# Patient Record
Sex: FEMALE | Race: WHITE | NOT HISPANIC OR LATINO | Employment: FULL TIME | URBAN - METROPOLITAN AREA
[De-identification: names, ages, dates, MRNs, and addresses within clinical notes are randomized per-mention and may not be internally consistent; named-entity substitution may affect disease eponyms.]

---

## 2022-01-02 ENCOUNTER — HOSPITAL ENCOUNTER (INPATIENT)
Facility: MEDICAL CENTER | Age: 57
LOS: 1 days | DRG: 854 | End: 2022-01-03
Attending: EMERGENCY MEDICINE | Admitting: INTERNAL MEDICINE
Payer: COMMERCIAL

## 2022-01-02 ENCOUNTER — ANESTHESIA (OUTPATIENT)
Dept: SURGERY | Facility: MEDICAL CENTER | Age: 57
DRG: 854 | End: 2022-01-02
Payer: COMMERCIAL

## 2022-01-02 ENCOUNTER — ANESTHESIA EVENT (OUTPATIENT)
Dept: SURGERY | Facility: MEDICAL CENTER | Age: 57
DRG: 854 | End: 2022-01-02
Payer: COMMERCIAL

## 2022-01-02 ENCOUNTER — APPOINTMENT (OUTPATIENT)
Dept: RADIOLOGY | Facility: MEDICAL CENTER | Age: 57
DRG: 854 | End: 2022-01-02
Attending: UROLOGY
Payer: COMMERCIAL

## 2022-01-02 ENCOUNTER — APPOINTMENT (OUTPATIENT)
Dept: RADIOLOGY | Facility: MEDICAL CENTER | Age: 57
DRG: 854 | End: 2022-01-02
Attending: EMERGENCY MEDICINE
Payer: COMMERCIAL

## 2022-01-02 ENCOUNTER — HOSPITAL ENCOUNTER (OUTPATIENT)
Dept: RADIOLOGY | Facility: MEDICAL CENTER | Age: 57
End: 2022-01-02

## 2022-01-02 DIAGNOSIS — N20.0 KIDNEY STONE ON LEFT SIDE: ICD-10-CM

## 2022-01-02 PROBLEM — D72.829 LEUCOCYTOSIS: Status: ACTIVE | Noted: 2022-01-02

## 2022-01-02 PROBLEM — A41.9 SEPSIS (HCC): Status: ACTIVE | Noted: 2022-01-02

## 2022-01-02 PROBLEM — N12 PYELONEPHRITIS: Status: ACTIVE | Noted: 2022-01-02

## 2022-01-02 LAB
ALBUMIN SERPL BCP-MCNC: 4.5 G/DL (ref 3.2–4.9)
ALBUMIN/GLOB SERPL: 1.9 G/DL
ALP SERPL-CCNC: 49 U/L (ref 30–99)
ALT SERPL-CCNC: 42 U/L (ref 2–50)
ANION GAP SERPL CALC-SCNC: 12 MMOL/L (ref 7–16)
APPEARANCE UR: CLEAR
AST SERPL-CCNC: 29 U/L (ref 12–45)
BACTERIA #/AREA URNS HPF: ABNORMAL /HPF
BASOPHILS # BLD AUTO: 0.5 % (ref 0–1.8)
BASOPHILS # BLD: 0.07 K/UL (ref 0–0.12)
BILIRUB SERPL-MCNC: 0.5 MG/DL (ref 0.1–1.5)
BILIRUB UR QL STRIP.AUTO: NEGATIVE
BUN SERPL-MCNC: 14 MG/DL (ref 8–22)
CALCIUM SERPL-MCNC: 9.4 MG/DL (ref 8.5–10.5)
CHLORIDE SERPL-SCNC: 105 MMOL/L (ref 96–112)
CO2 SERPL-SCNC: 23 MMOL/L (ref 20–33)
COLOR UR: YELLOW
CREAT SERPL-MCNC: 0.56 MG/DL (ref 0.5–1.4)
EOSINOPHIL # BLD AUTO: 0.07 K/UL (ref 0–0.51)
EOSINOPHIL NFR BLD: 0.5 % (ref 0–6.9)
EPI CELLS #/AREA URNS HPF: NEGATIVE /HPF
ERYTHROCYTE [DISTWIDTH] IN BLOOD BY AUTOMATED COUNT: 42.1 FL (ref 35.9–50)
FLUAV RNA SPEC QL NAA+PROBE: NEGATIVE
FLUBV RNA SPEC QL NAA+PROBE: NEGATIVE
GLOBULIN SER CALC-MCNC: 2.4 G/DL (ref 1.9–3.5)
GLUCOSE SERPL-MCNC: 104 MG/DL (ref 65–99)
GLUCOSE UR STRIP.AUTO-MCNC: NEGATIVE MG/DL
HCT VFR BLD AUTO: 44 % (ref 37–47)
HGB BLD-MCNC: 14.6 G/DL (ref 12–16)
HYALINE CASTS #/AREA URNS LPF: ABNORMAL /LPF
IMM GRANULOCYTES # BLD AUTO: 0.07 K/UL (ref 0–0.11)
IMM GRANULOCYTES NFR BLD AUTO: 0.5 % (ref 0–0.9)
INR PPP: 1 (ref 0.87–1.13)
KETONES UR STRIP.AUTO-MCNC: NEGATIVE MG/DL
LACTATE BLD-SCNC: 1.3 MMOL/L (ref 0.5–2)
LEUKOCYTE ESTERASE UR QL STRIP.AUTO: ABNORMAL
LIPASE SERPL-CCNC: 24 U/L (ref 11–82)
LYMPHOCYTES # BLD AUTO: 1.62 K/UL (ref 1–4.8)
LYMPHOCYTES NFR BLD: 10.7 % (ref 22–41)
MCH RBC QN AUTO: 30.3 PG (ref 27–33)
MCHC RBC AUTO-ENTMCNC: 33.2 G/DL (ref 33.6–35)
MCV RBC AUTO: 91.3 FL (ref 81.4–97.8)
MICRO URNS: ABNORMAL
MONOCYTES # BLD AUTO: 0.94 K/UL (ref 0–0.85)
MONOCYTES NFR BLD AUTO: 6.2 % (ref 0–13.4)
NEUTROPHILS # BLD AUTO: 12.31 K/UL (ref 2–7.15)
NEUTROPHILS NFR BLD: 81.6 % (ref 44–72)
NITRITE UR QL STRIP.AUTO: POSITIVE
NRBC # BLD AUTO: 0 K/UL
NRBC BLD-RTO: 0 /100 WBC
PH UR STRIP.AUTO: 7.5 [PH] (ref 5–8)
PLATELET # BLD AUTO: 318 K/UL (ref 164–446)
PMV BLD AUTO: 9.5 FL (ref 9–12.9)
POTASSIUM SERPL-SCNC: 4.1 MMOL/L (ref 3.6–5.5)
PROT SERPL-MCNC: 6.9 G/DL (ref 6–8.2)
PROT UR QL STRIP: 30 MG/DL
PROTHROMBIN TIME: 12.9 SEC (ref 12–14.6)
RBC # BLD AUTO: 4.82 M/UL (ref 4.2–5.4)
RBC # URNS HPF: ABNORMAL /HPF
RBC UR QL AUTO: ABNORMAL
RSV RNA SPEC QL NAA+PROBE: NEGATIVE
SARS-COV-2 RNA RESP QL NAA+PROBE: NOTDETECTED
SODIUM SERPL-SCNC: 140 MMOL/L (ref 135–145)
SP GR UR STRIP.AUTO: 1.01
SPECIMEN SOURCE: NORMAL
UROBILINOGEN UR STRIP.AUTO-MCNC: 1 MG/DL
WBC # BLD AUTO: 15.1 K/UL (ref 4.8–10.8)
WBC #/AREA URNS HPF: ABNORMAL /HPF

## 2022-01-02 PROCEDURE — 770001 HCHG ROOM/CARE - MED/SURG/GYN PRIV*

## 2022-01-02 PROCEDURE — 87040 BLOOD CULTURE FOR BACTERIA: CPT

## 2022-01-02 PROCEDURE — C2617 STENT, NON-COR, TEM W/O DEL: HCPCS | Performed by: UROLOGY

## 2022-01-02 PROCEDURE — 0T778DZ DILATION OF LEFT URETER WITH INTRALUMINAL DEVICE, VIA NATURAL OR ARTIFICIAL OPENING ENDOSCOPIC: ICD-10-PCS | Performed by: UROLOGY

## 2022-01-02 PROCEDURE — 81001 URINALYSIS AUTO W/SCOPE: CPT

## 2022-01-02 PROCEDURE — 160048 HCHG OR STATISTICAL LEVEL 1-5: Performed by: UROLOGY

## 2022-01-02 PROCEDURE — 160009 HCHG ANES TIME/MIN: Performed by: UROLOGY

## 2022-01-02 PROCEDURE — A9270 NON-COVERED ITEM OR SERVICE: HCPCS | Performed by: INTERNAL MEDICINE

## 2022-01-02 PROCEDURE — 99223 1ST HOSP IP/OBS HIGH 75: CPT | Performed by: INTERNAL MEDICINE

## 2022-01-02 PROCEDURE — 0241U HCHG SARS-COV-2 COVID-19 NFCT DS RESP RNA 4 TRGT MIC: CPT

## 2022-01-02 PROCEDURE — 700111 HCHG RX REV CODE 636 W/ 250 OVERRIDE (IP): Performed by: INTERNAL MEDICINE

## 2022-01-02 PROCEDURE — 87086 URINE CULTURE/COLONY COUNT: CPT

## 2022-01-02 PROCEDURE — 71045 X-RAY EXAM CHEST 1 VIEW: CPT

## 2022-01-02 PROCEDURE — 85610 PROTHROMBIN TIME: CPT

## 2022-01-02 PROCEDURE — 96375 TX/PRO/DX INJ NEW DRUG ADDON: CPT

## 2022-01-02 PROCEDURE — 96374 THER/PROPH/DIAG INJ IV PUSH: CPT

## 2022-01-02 PROCEDURE — 700102 HCHG RX REV CODE 250 W/ 637 OVERRIDE(OP): Performed by: INTERNAL MEDICINE

## 2022-01-02 PROCEDURE — 700101 HCHG RX REV CODE 250: Performed by: ANESTHESIOLOGY

## 2022-01-02 PROCEDURE — C1769 GUIDE WIRE: HCPCS | Performed by: UROLOGY

## 2022-01-02 PROCEDURE — 84145 PROCALCITONIN (PCT): CPT

## 2022-01-02 PROCEDURE — 700111 HCHG RX REV CODE 636 W/ 250 OVERRIDE (IP): Performed by: EMERGENCY MEDICINE

## 2022-01-02 PROCEDURE — 85025 COMPLETE CBC W/AUTO DIFF WBC: CPT

## 2022-01-02 PROCEDURE — 99291 CRITICAL CARE FIRST HOUR: CPT

## 2022-01-02 PROCEDURE — 87186 SC STD MICRODIL/AGAR DIL: CPT

## 2022-01-02 PROCEDURE — C9803 HOPD COVID-19 SPEC COLLECT: HCPCS | Performed by: EMERGENCY MEDICINE

## 2022-01-02 PROCEDURE — 36415 COLL VENOUS BLD VENIPUNCTURE: CPT

## 2022-01-02 PROCEDURE — 83690 ASSAY OF LIPASE: CPT

## 2022-01-02 PROCEDURE — 83605 ASSAY OF LACTIC ACID: CPT

## 2022-01-02 PROCEDURE — 700105 HCHG RX REV CODE 258: Performed by: ANESTHESIOLOGY

## 2022-01-02 PROCEDURE — 700105 HCHG RX REV CODE 258: Performed by: INTERNAL MEDICINE

## 2022-01-02 PROCEDURE — 160035 HCHG PACU - 1ST 60 MINS PHASE I: Performed by: UROLOGY

## 2022-01-02 PROCEDURE — 160028 HCHG SURGERY MINUTES - 1ST 30 MINS LEVEL 3: Performed by: UROLOGY

## 2022-01-02 PROCEDURE — 87077 CULTURE AEROBIC IDENTIFY: CPT

## 2022-01-02 PROCEDURE — 80053 COMPREHEN METABOLIC PANEL: CPT

## 2022-01-02 PROCEDURE — 160002 HCHG RECOVERY MINUTES (STAT): Performed by: UROLOGY

## 2022-01-02 PROCEDURE — 700111 HCHG RX REV CODE 636 W/ 250 OVERRIDE (IP): Performed by: ANESTHESIOLOGY

## 2022-01-02 DEVICE — STENT UROLOGICAL POLARIS 6X26  ULTRA: Type: IMPLANTABLE DEVICE | Site: URETER | Status: FUNCTIONAL

## 2022-01-02 RX ORDER — HYDRALAZINE HYDROCHLORIDE 20 MG/ML
5 INJECTION INTRAMUSCULAR; INTRAVENOUS
Status: DISCONTINUED | OUTPATIENT
Start: 2022-01-02 | End: 2022-01-02 | Stop reason: HOSPADM

## 2022-01-02 RX ORDER — TRAZODONE HYDROCHLORIDE 50 MG/1
100 TABLET ORAL NIGHTLY
Status: DISCONTINUED | OUTPATIENT
Start: 2022-01-02 | End: 2022-01-03 | Stop reason: HOSPADM

## 2022-01-02 RX ORDER — HYDROMORPHONE HYDROCHLORIDE 1 MG/ML
0.5 INJECTION, SOLUTION INTRAMUSCULAR; INTRAVENOUS; SUBCUTANEOUS ONCE
Status: COMPLETED | OUTPATIENT
Start: 2022-01-02 | End: 2022-01-02

## 2022-01-02 RX ORDER — ONDANSETRON 2 MG/ML
4 INJECTION INTRAMUSCULAR; INTRAVENOUS ONCE
Status: COMPLETED | OUTPATIENT
Start: 2022-01-02 | End: 2022-01-02

## 2022-01-02 RX ORDER — DEXAMETHASONE SODIUM PHOSPHATE 4 MG/ML
INJECTION, SOLUTION INTRA-ARTICULAR; INTRALESIONAL; INTRAMUSCULAR; INTRAVENOUS; SOFT TISSUE PRN
Status: DISCONTINUED | OUTPATIENT
Start: 2022-01-02 | End: 2022-01-02 | Stop reason: SURG

## 2022-01-02 RX ORDER — PROMETHAZINE HYDROCHLORIDE 25 MG/1
12.5-25 SUPPOSITORY RECTAL EVERY 4 HOURS PRN
Status: DISCONTINUED | OUTPATIENT
Start: 2022-01-02 | End: 2022-01-03 | Stop reason: HOSPADM

## 2022-01-02 RX ORDER — CEFAZOLIN SODIUM 1 G/3ML
INJECTION, POWDER, FOR SOLUTION INTRAMUSCULAR; INTRAVENOUS PRN
Status: DISCONTINUED | OUTPATIENT
Start: 2022-01-02 | End: 2022-01-02 | Stop reason: SURG

## 2022-01-02 RX ORDER — TAMSULOSIN HYDROCHLORIDE 0.4 MG/1
0.4 CAPSULE ORAL
Status: DISCONTINUED | OUTPATIENT
Start: 2022-01-02 | End: 2022-01-03 | Stop reason: HOSPADM

## 2022-01-02 RX ORDER — SODIUM CHLORIDE, SODIUM LACTATE, POTASSIUM CHLORIDE, CALCIUM CHLORIDE 600; 310; 30; 20 MG/100ML; MG/100ML; MG/100ML; MG/100ML
1000 INJECTION, SOLUTION INTRAVENOUS ONCE
Status: DISCONTINUED | OUTPATIENT
Start: 2022-01-02 | End: 2022-01-03 | Stop reason: HOSPADM

## 2022-01-02 RX ORDER — HALOPERIDOL 5 MG/ML
1 INJECTION INTRAMUSCULAR
Status: DISCONTINUED | OUTPATIENT
Start: 2022-01-02 | End: 2022-01-02 | Stop reason: HOSPADM

## 2022-01-02 RX ORDER — MIDAZOLAM HYDROCHLORIDE 1 MG/ML
1 INJECTION INTRAMUSCULAR; INTRAVENOUS
Status: DISCONTINUED | OUTPATIENT
Start: 2022-01-02 | End: 2022-01-02 | Stop reason: HOSPADM

## 2022-01-02 RX ORDER — ESTRADIOL 2 MG/1
2 TABLET ORAL DAILY
Status: DISCONTINUED | OUTPATIENT
Start: 2022-01-03 | End: 2022-01-02

## 2022-01-02 RX ORDER — PROMETHAZINE HYDROCHLORIDE 25 MG/1
12.5-25 TABLET ORAL EVERY 4 HOURS PRN
Status: DISCONTINUED | OUTPATIENT
Start: 2022-01-02 | End: 2022-01-03 | Stop reason: HOSPADM

## 2022-01-02 RX ORDER — HYDROMORPHONE HYDROCHLORIDE 1 MG/ML
0.2 INJECTION, SOLUTION INTRAMUSCULAR; INTRAVENOUS; SUBCUTANEOUS
Status: DISCONTINUED | OUTPATIENT
Start: 2022-01-02 | End: 2022-01-02 | Stop reason: HOSPADM

## 2022-01-02 RX ORDER — METOPROLOL TARTRATE 1 MG/ML
1 INJECTION, SOLUTION INTRAVENOUS
Status: DISCONTINUED | OUTPATIENT
Start: 2022-01-02 | End: 2022-01-02 | Stop reason: HOSPADM

## 2022-01-02 RX ORDER — MORPHINE SULFATE 4 MG/ML
2 INJECTION INTRAVENOUS
Status: DISCONTINUED | OUTPATIENT
Start: 2022-01-02 | End: 2022-01-03 | Stop reason: HOSPADM

## 2022-01-02 RX ORDER — AMOXICILLIN 250 MG
2 CAPSULE ORAL 2 TIMES DAILY
Status: DISCONTINUED | OUTPATIENT
Start: 2022-01-03 | End: 2022-01-03 | Stop reason: HOSPADM

## 2022-01-02 RX ORDER — TAMSULOSIN HYDROCHLORIDE 0.4 MG/1
0.4 CAPSULE ORAL
Status: ON HOLD | COMMUNITY
End: 2022-01-18

## 2022-01-02 RX ORDER — TRAZODONE HYDROCHLORIDE 100 MG/1
100 TABLET ORAL NIGHTLY
COMMUNITY

## 2022-01-02 RX ORDER — BISACODYL 10 MG
10 SUPPOSITORY, RECTAL RECTAL
Status: DISCONTINUED | OUTPATIENT
Start: 2022-01-02 | End: 2022-01-03 | Stop reason: HOSPADM

## 2022-01-02 RX ORDER — SODIUM CHLORIDE, SODIUM LACTATE, POTASSIUM CHLORIDE, CALCIUM CHLORIDE 600; 310; 30; 20 MG/100ML; MG/100ML; MG/100ML; MG/100ML
INJECTION, SOLUTION INTRAVENOUS CONTINUOUS
Status: DISCONTINUED | OUTPATIENT
Start: 2022-01-02 | End: 2022-01-02 | Stop reason: HOSPADM

## 2022-01-02 RX ORDER — SODIUM CHLORIDE, SODIUM LACTATE, POTASSIUM CHLORIDE, CALCIUM CHLORIDE 600; 310; 30; 20 MG/100ML; MG/100ML; MG/100ML; MG/100ML
INJECTION, SOLUTION INTRAVENOUS
Status: DISCONTINUED | OUTPATIENT
Start: 2022-01-02 | End: 2022-01-02 | Stop reason: SURG

## 2022-01-02 RX ORDER — ESTRADIOL 2 MG/1
2 TABLET ORAL EVERY EVENING
COMMUNITY

## 2022-01-02 RX ORDER — HYDROMORPHONE HYDROCHLORIDE 1 MG/ML
0.4 INJECTION, SOLUTION INTRAMUSCULAR; INTRAVENOUS; SUBCUTANEOUS
Status: DISCONTINUED | OUTPATIENT
Start: 2022-01-02 | End: 2022-01-02 | Stop reason: HOSPADM

## 2022-01-02 RX ORDER — HEPARIN SODIUM 5000 [USP'U]/ML
5000 INJECTION, SOLUTION INTRAVENOUS; SUBCUTANEOUS EVERY 8 HOURS
Status: DISCONTINUED | OUTPATIENT
Start: 2022-01-02 | End: 2022-01-03 | Stop reason: HOSPADM

## 2022-01-02 RX ORDER — SODIUM CHLORIDE 9 MG/ML
INJECTION, SOLUTION INTRAVENOUS CONTINUOUS
Status: DISCONTINUED | OUTPATIENT
Start: 2022-01-02 | End: 2022-01-03 | Stop reason: HOSPADM

## 2022-01-02 RX ORDER — DULOXETIN HYDROCHLORIDE 60 MG/1
60 CAPSULE, DELAYED RELEASE ORAL EVERY EVENING
COMMUNITY

## 2022-01-02 RX ORDER — ONDANSETRON 2 MG/ML
INJECTION INTRAMUSCULAR; INTRAVENOUS PRN
Status: DISCONTINUED | OUTPATIENT
Start: 2022-01-02 | End: 2022-01-02 | Stop reason: SURG

## 2022-01-02 RX ORDER — HYDROMORPHONE HYDROCHLORIDE 1 MG/ML
0.1 INJECTION, SOLUTION INTRAMUSCULAR; INTRAVENOUS; SUBCUTANEOUS
Status: DISCONTINUED | OUTPATIENT
Start: 2022-01-02 | End: 2022-01-02 | Stop reason: HOSPADM

## 2022-01-02 RX ORDER — DULOXETIN HYDROCHLORIDE 30 MG/1
60 CAPSULE, DELAYED RELEASE ORAL DAILY
Status: DISCONTINUED | OUTPATIENT
Start: 2022-01-02 | End: 2022-01-03 | Stop reason: HOSPADM

## 2022-01-02 RX ORDER — ONDANSETRON 2 MG/ML
4 INJECTION INTRAMUSCULAR; INTRAVENOUS EVERY 4 HOURS PRN
Status: DISCONTINUED | OUTPATIENT
Start: 2022-01-02 | End: 2022-01-03 | Stop reason: HOSPADM

## 2022-01-02 RX ORDER — ROCURONIUM BROMIDE 10 MG/ML
INJECTION, SOLUTION INTRAVENOUS PRN
Status: DISCONTINUED | OUTPATIENT
Start: 2022-01-02 | End: 2022-01-02 | Stop reason: SURG

## 2022-01-02 RX ORDER — ESTRADIOL 1 MG/1
2 TABLET ORAL DAILY
Status: DISCONTINUED | OUTPATIENT
Start: 2022-01-03 | End: 2022-01-03 | Stop reason: HOSPADM

## 2022-01-02 RX ORDER — OXYCODONE HCL 5 MG/5 ML
10 SOLUTION, ORAL ORAL
Status: DISCONTINUED | OUTPATIENT
Start: 2022-01-02 | End: 2022-01-02 | Stop reason: HOSPADM

## 2022-01-02 RX ORDER — MEPERIDINE HYDROCHLORIDE 25 MG/ML
12.5 INJECTION INTRAMUSCULAR; INTRAVENOUS; SUBCUTANEOUS
Status: DISCONTINUED | OUTPATIENT
Start: 2022-01-02 | End: 2022-01-02 | Stop reason: HOSPADM

## 2022-01-02 RX ORDER — LABETALOL HYDROCHLORIDE 5 MG/ML
5 INJECTION, SOLUTION INTRAVENOUS
Status: DISCONTINUED | OUTPATIENT
Start: 2022-01-02 | End: 2022-01-02 | Stop reason: HOSPADM

## 2022-01-02 RX ORDER — OXYCODONE HYDROCHLORIDE 5 MG/1
2.5 TABLET ORAL
Status: DISCONTINUED | OUTPATIENT
Start: 2022-01-02 | End: 2022-01-03 | Stop reason: HOSPADM

## 2022-01-02 RX ORDER — ONDANSETRON 4 MG/1
4 TABLET, ORALLY DISINTEGRATING ORAL EVERY 4 HOURS PRN
Status: DISCONTINUED | OUTPATIENT
Start: 2022-01-02 | End: 2022-01-03 | Stop reason: HOSPADM

## 2022-01-02 RX ORDER — ONDANSETRON 2 MG/ML
4 INJECTION INTRAMUSCULAR; INTRAVENOUS
Status: DISCONTINUED | OUTPATIENT
Start: 2022-01-02 | End: 2022-01-02 | Stop reason: HOSPADM

## 2022-01-02 RX ORDER — CEFTRIAXONE 2 G/1
2 INJECTION, POWDER, FOR SOLUTION INTRAMUSCULAR; INTRAVENOUS ONCE
Status: COMPLETED | OUTPATIENT
Start: 2022-01-02 | End: 2022-01-02

## 2022-01-02 RX ORDER — OXYCODONE HCL 5 MG/5 ML
5 SOLUTION, ORAL ORAL
Status: DISCONTINUED | OUTPATIENT
Start: 2022-01-02 | End: 2022-01-02 | Stop reason: HOSPADM

## 2022-01-02 RX ORDER — PROCHLORPERAZINE EDISYLATE 5 MG/ML
5-10 INJECTION INTRAMUSCULAR; INTRAVENOUS EVERY 4 HOURS PRN
Status: DISCONTINUED | OUTPATIENT
Start: 2022-01-02 | End: 2022-01-03 | Stop reason: HOSPADM

## 2022-01-02 RX ORDER — FENOFIBRATE 145 MG/1
145 TABLET, COATED ORAL EVERY EVENING
COMMUNITY

## 2022-01-02 RX ORDER — ACETAMINOPHEN 325 MG/1
650 TABLET ORAL EVERY 6 HOURS PRN
Status: DISCONTINUED | OUTPATIENT
Start: 2022-01-02 | End: 2022-01-03 | Stop reason: HOSPADM

## 2022-01-02 RX ORDER — DIPHENHYDRAMINE HYDROCHLORIDE 50 MG/ML
12.5 INJECTION INTRAMUSCULAR; INTRAVENOUS
Status: DISCONTINUED | OUTPATIENT
Start: 2022-01-02 | End: 2022-01-02 | Stop reason: HOSPADM

## 2022-01-02 RX ORDER — MIDAZOLAM HYDROCHLORIDE 1 MG/ML
INJECTION INTRAMUSCULAR; INTRAVENOUS PRN
Status: DISCONTINUED | OUTPATIENT
Start: 2022-01-02 | End: 2022-01-02 | Stop reason: SURG

## 2022-01-02 RX ORDER — KETOROLAC TROMETHAMINE 30 MG/ML
INJECTION, SOLUTION INTRAMUSCULAR; INTRAVENOUS PRN
Status: DISCONTINUED | OUTPATIENT
Start: 2022-01-02 | End: 2022-01-02 | Stop reason: SURG

## 2022-01-02 RX ORDER — LIDOCAINE HYDROCHLORIDE 20 MG/ML
INJECTION, SOLUTION EPIDURAL; INFILTRATION; INTRACAUDAL; PERINEURAL PRN
Status: DISCONTINUED | OUTPATIENT
Start: 2022-01-02 | End: 2022-01-02 | Stop reason: SURG

## 2022-01-02 RX ORDER — OXYCODONE HYDROCHLORIDE 5 MG/1
5 TABLET ORAL
Status: DISCONTINUED | OUTPATIENT
Start: 2022-01-02 | End: 2022-01-03 | Stop reason: HOSPADM

## 2022-01-02 RX ORDER — POLYETHYLENE GLYCOL 3350 17 G/17G
1 POWDER, FOR SOLUTION ORAL
Status: DISCONTINUED | OUTPATIENT
Start: 2022-01-02 | End: 2022-01-03 | Stop reason: HOSPADM

## 2022-01-02 RX ORDER — FENOFIBRATE 67 MG/1
134 CAPSULE ORAL DAILY
Status: DISCONTINUED | OUTPATIENT
Start: 2022-01-02 | End: 2022-01-03 | Stop reason: HOSPADM

## 2022-01-02 RX ADMIN — CEFAZOLIN 2 G: 330 INJECTION, POWDER, FOR SOLUTION INTRAMUSCULAR; INTRAVENOUS at 18:25

## 2022-01-02 RX ADMIN — ONDANSETRON 4 MG: 2 INJECTION INTRAMUSCULAR; INTRAVENOUS at 16:30

## 2022-01-02 RX ADMIN — SODIUM CHLORIDE, POTASSIUM CHLORIDE, SODIUM LACTATE AND CALCIUM CHLORIDE: 600; 310; 30; 20 INJECTION, SOLUTION INTRAVENOUS at 18:06

## 2022-01-02 RX ADMIN — MIDAZOLAM HYDROCHLORIDE 1 MG: 1 INJECTION, SOLUTION INTRAMUSCULAR; INTRAVENOUS at 18:12

## 2022-01-02 RX ADMIN — KETOROLAC TROMETHAMINE 30 MG: 30 INJECTION, SOLUTION INTRAMUSCULAR at 18:34

## 2022-01-02 RX ADMIN — HEPARIN SODIUM 5000 UNITS: 5000 INJECTION, SOLUTION INTRAVENOUS; SUBCUTANEOUS at 21:43

## 2022-01-02 RX ADMIN — DEXAMETHASONE SODIUM PHOSPHATE 4 MG: 4 INJECTION, SOLUTION INTRA-ARTICULAR; INTRALESIONAL; INTRAMUSCULAR; INTRAVENOUS; SOFT TISSUE at 18:19

## 2022-01-02 RX ADMIN — CEFTRIAXONE SODIUM 2 G: 2 INJECTION, POWDER, FOR SOLUTION INTRAMUSCULAR; INTRAVENOUS at 16:54

## 2022-01-02 RX ADMIN — HYDROMORPHONE HYDROCHLORIDE 0.5 MG: 1 INJECTION, SOLUTION INTRAMUSCULAR; INTRAVENOUS; SUBCUTANEOUS at 16:32

## 2022-01-02 RX ADMIN — ONDANSETRON 4 MG: 2 INJECTION INTRAMUSCULAR; INTRAVENOUS at 18:26

## 2022-01-02 RX ADMIN — SUGAMMADEX 200 MG: 100 INJECTION, SOLUTION INTRAVENOUS at 18:36

## 2022-01-02 RX ADMIN — PROPOFOL 225 MG: 10 INJECTION, EMULSION INTRAVENOUS at 18:20

## 2022-01-02 RX ADMIN — TRAZODONE HYDROCHLORIDE 100 MG: 50 TABLET ORAL at 21:43

## 2022-01-02 RX ADMIN — ROCURONIUM BROMIDE 40 MG: 10 INJECTION, SOLUTION INTRAVENOUS at 18:20

## 2022-01-02 RX ADMIN — SODIUM CHLORIDE: 9 INJECTION, SOLUTION INTRAVENOUS at 21:58

## 2022-01-02 RX ADMIN — FENTANYL CITRATE 100 MCG: 50 INJECTION, SOLUTION INTRAMUSCULAR; INTRAVENOUS at 18:14

## 2022-01-02 RX ADMIN — LIDOCAINE HYDROCHLORIDE 80 MG: 20 INJECTION, SOLUTION EPIDURAL; INFILTRATION; INTRACAUDAL at 18:20

## 2022-01-02 ASSESSMENT — ENCOUNTER SYMPTOMS
ABDOMINAL PAIN: 0
DIZZINESS: 0
STRIDOR: 0
WHEEZING: 0
EYE REDNESS: 0
FOCAL WEAKNESS: 0
COUGH: 0
FEVER: 0
SPUTUM PRODUCTION: 0
CONSTIPATION: 0
VOMITING: 0
DIARRHEA: 0
EYE DISCHARGE: 0
FLANK PAIN: 1
ORTHOPNEA: 0
NERVOUS/ANXIOUS: 0
SINUS PAIN: 0
MYALGIAS: 0
CLAUDICATION: 0
SHORTNESS OF BREATH: 0
EYE PAIN: 0
HEADACHES: 0
SEIZURES: 0
SORE THROAT: 0
NECK PAIN: 0
DEPRESSION: 0
BLURRED VISION: 0
BACK PAIN: 0
BLOOD IN STOOL: 0
HEARTBURN: 0
PALPITATIONS: 0
WEIGHT LOSS: 0
CHILLS: 0
INSOMNIA: 0
NAUSEA: 0

## 2022-01-02 ASSESSMENT — COGNITIVE AND FUNCTIONAL STATUS - GENERAL
SUGGESTED CMS G CODE MODIFIER DAILY ACTIVITY: CH
MOBILITY SCORE: 24
SUGGESTED CMS G CODE MODIFIER MOBILITY: CH
DAILY ACTIVITIY SCORE: 24

## 2022-01-02 ASSESSMENT — LIFESTYLE VARIABLES
TOTAL SCORE: 0
TOTAL SCORE: 0
AVERAGE NUMBER OF DAYS PER WEEK YOU HAVE A DRINK CONTAINING ALCOHOL: 0
ON A TYPICAL DAY WHEN YOU DRINK ALCOHOL HOW MANY DRINKS DO YOU HAVE: 0
HOW MANY TIMES IN THE PAST YEAR HAVE YOU HAD 5 OR MORE DRINKS IN A DAY: 0
DOES PATIENT WANT TO STOP DRINKING: NO
TOTAL SCORE: 0
HAVE YOU EVER FELT YOU SHOULD CUT DOWN ON YOUR DRINKING: NO
HAVE PEOPLE ANNOYED YOU BY CRITICIZING YOUR DRINKING: NO
ALCOHOL_USE: NO
EVER HAD A DRINK FIRST THING IN THE MORNING TO STEADY YOUR NERVES TO GET RID OF A HANGOVER: NO
EVER FELT BAD OR GUILTY ABOUT YOUR DRINKING: NO
CONSUMPTION TOTAL: NEGATIVE

## 2022-01-02 ASSESSMENT — PATIENT HEALTH QUESTIONNAIRE - PHQ9
2. FEELING DOWN, DEPRESSED, IRRITABLE, OR HOPELESS: NOT AT ALL
1. LITTLE INTEREST OR PLEASURE IN DOING THINGS: NOT AT ALL
SUM OF ALL RESPONSES TO PHQ9 QUESTIONS 1 AND 2: 0
1. LITTLE INTEREST OR PLEASURE IN DOING THINGS: NOT AT ALL
SUM OF ALL RESPONSES TO PHQ9 QUESTIONS 1 AND 2: 0
2. FEELING DOWN, DEPRESSED, IRRITABLE, OR HOPELESS: NOT AT ALL

## 2022-01-02 ASSESSMENT — PAIN DESCRIPTION - PAIN TYPE: TYPE: ACUTE PAIN

## 2022-01-02 ASSESSMENT — FIBROSIS 4 INDEX: FIB4 SCORE: 0.8

## 2022-01-02 ASSESSMENT — PAIN SCALES - GENERAL: PAIN_LEVEL: 1

## 2022-01-02 NOTE — ED NOTES
Patient in lobby complaining pain is getting worse. Pt and guest notified of process and explained they are next to go to the pod they are assigned to.

## 2022-01-02 NOTE — ED TRIAGE NOTES
Nunu Kira Reyes  56 y.o.  female  Chief Complaint   Patient presents with   • Flank Pain     L sided, started yesterday, colicky, radiating into L abdomen, hx kidney stones. Able to urinate. Seen at [place] yesterday & given fluids, pain maids & tamulosin. CT shoing 3 stones, pt unsure if obstructing. Told to come to ED in order to see urologist.       Copy of Ct given to film room. Given specimen cup in triage with clean catch urine instructions.  Patient educated on triage process, to alert staff if any changes in condition.

## 2022-01-02 NOTE — ED NOTES
Pt ambulated to room 4 w/ steady gait, A&Ox4. Agree w/ triage note. Outside images uploaded into system from /[facility]. Hx of kidney stones x10 years ago that required surgery. Denies peeing blood, but intense pain. Sent by [facility] for urology consult  Pt on the monitor, changing into a gown,  at BS

## 2022-01-02 NOTE — ED PROVIDER NOTES
ED Provider Note    CHIEF COMPLAINT  Chief Complaint   Patient presents with   • Flank Pain     L sided, started yesterday, colicky, radiating into L abdomen, hx kidney stones. Able to urinate. Seen at [facility] yesterday & given fluids, pain maids & tamulosin. CT shoing 3 stones, pt unsure if obstructing. Told to come to ED in order to see urologist.       HPI  Nunu Reyes is a 56 y.o. female who presents for evaluation of left-sided flank pain low-grade fever not feeling well.  The patient came in from [facility].  She was seen at their emergency department yesterday for the same.  She was diagnosed with what appeared to be a 7 mm left-sided obstructing kidney stone.  She was advised to drive to [place] to seek her urological consultation.  She apparently has had kidney stones in the past.  She reports low-grade fever and dysuria.  She also reports general fatigue.  She has been told that her oxygen levels are on the low end of normal and this been going on for a while.  She has not ate or drank anything for almost 24 hours.  Pain is constant, with waves of colicky type pain.    REVIEW OF SYSTEMS  See HPI for further details.  As of for low-grade fevers chills left flank pain all other systems are negative.     PAST MEDICAL HISTORY  No past medical history on file.  History of prior kidney stones  FAMILY HISTORY  Noncontributory    SOCIAL HISTORY  Social History     Socioeconomic History   • Marital status:      Spouse name: Not on file   • Number of children: Not on file   • Years of education: Not on file   • Highest education level: Not on file   Occupational History   • Not on file   Tobacco Use   • Smoking status: Not on file   • Smokeless tobacco: Not on file   Substance and Sexual Activity   • Alcohol use: Not on file   • Drug use: Not on file   • Sexual activity: Not on file   Other Topics Concern   • Not on file   Social History Narrative   • Not on file     Social Determinants of  Health     Financial Resource Strain:    • Difficulty of Paying Living Expenses: Not on file   Food Insecurity:    • Worried About Running Out of Food in the Last Year: Not on file   • Ran Out of Food in the Last Year: Not on file   Transportation Needs:    • Lack of Transportation (Medical): Not on file   • Lack of Transportation (Non-Medical): Not on file   Physical Activity:    • Days of Exercise per Week: Not on file   • Minutes of Exercise per Session: Not on file   Stress:    • Feeling of Stress : Not on file   Social Connections:    • Frequency of Communication with Friends and Family: Not on file   • Frequency of Social Gatherings with Friends and Family: Not on file   • Attends Pent[employer]al[employer] Services: Not on file   • Active Member of Clubs or Organizations: Not on file   • Attends Club or Organization Meetings: Not on file   • Marital Status: Not on file   Intimate Partner Violence:    • Fear of Current or Ex-Partner: Not on file   • Emotionally Abused: Not on file   • Physically Abused: Not on file   • Sexually Abused: Not on file   Housing Stability:    • Unable to Pay for Housing in the Last Year: Not on file   • Number of Places Lived in the Last Year: Not on file   • Unstable Housing in the Last Year: Not on file      lives in [place]  SURGICAL HISTORY  No past surgical history on file.  No major surgeries  CURRENT MEDICATIONS  Home Medications     Reviewed by Liseth Eaton R.N. (Registered Nurse) on 01/02/22 at 1332  Med List Status: Not Addressed   Medication Last Dose Status   DULoxetine (CYMBALTA) 60 MG Cap DR Particles delayed-release capsule 1/1/2022 Active   estradiol (ESTRACE) 2 MG Tab 1/1/2022 Active   fenofibrate (TRICOR) 145 MG Tab 1/1/2022 Active   tamsulosin (FLOMAX) 0.4 MG capsule 1/1/2022 Active   traZODone (DESYREL) 50 MG Tab 1/1/2022 Active                ALLERGIES  Not on File    PHYSICAL EXAM  VITAL SIGNS: /78   Pulse (!) 101   Temp 37.3 °C (99.1 °F) (Temporal)    "Resp (!) 22   Ht 1.676 m (5' 6\")   Wt 114 kg (250 lb 7.1 oz)   SpO2 92%   BMI 40.42 kg/m²       Constitutional: Ill-appearing  HENT: Normocephalic, Atraumatic, Bilateral external ears normal, Oropharynx moist, No oral exudates, Nose normal.   Eyes: PERRLA, EOMI, Conjunctiva normal, No discharge.   Neck: Normal range of motion, No tenderness, Supple, No stridor.   Cardiovascular: Tachycardic, Normal rhythm, No murmurs, No rubs, No gallops.   Thorax & Lungs: Normal breath sounds, No respiratory distress, No wheezing, No chest tenderness.   Abdomen: Bowel sounds normal, Soft, No tenderness, No masses, No pulsatile masses.   Skin: Warm, Dry, No erythema, No rash.   Back: Left-sided CVA tenderness.   Extremities: Intact distal pulses, No edema, No tenderness, No cyanosis, No clubbing.   Neurologic: Alert & oriented x 3, Normal motor function, Normal sensory function, No focal deficits noted.   Psychiatric: Affect normal, Judgment normal, Mood normal.     Results for orders placed or performed during the hospital encounter of 01/02/22   CBC WITH DIFFERENTIAL   Result Value Ref Range    WBC 15.1 (H) 4.8 - 10.8 K/uL    RBC 4.82 4.20 - 5.40 M/uL    Hemoglobin 14.6 12.0 - 16.0 g/dL    Hematocrit 44.0 37.0 - 47.0 %    MCV 91.3 81.4 - 97.8 fL    MCH 30.3 27.0 - 33.0 pg    MCHC 33.2 (L) 33.6 - 35.0 g/dL    RDW 42.1 35.9 - 50.0 fL    Platelet Count 318 164 - 446 K/uL    MPV 9.5 9.0 - 12.9 fL    Neutrophils-Polys 81.60 (H) 44.00 - 72.00 %    Lymphocytes 10.70 (L) 22.00 - 41.00 %    Monocytes 6.20 0.00 - 13.40 %    Eosinophils 0.50 0.00 - 6.90 %    Basophils 0.50 0.00 - 1.80 %    Immature Granulocytes 0.50 0.00 - 0.90 %    Nucleated RBC 0.00 /100 WBC    Neutrophils (Absolute) 12.31 (H) 2.00 - 7.15 K/uL    Lymphs (Absolute) 1.62 1.00 - 4.80 K/uL    Monos (Absolute) 0.94 (H) 0.00 - 0.85 K/uL    Eos (Absolute) 0.07 0.00 - 0.51 K/uL    Baso (Absolute) 0.07 0.00 - 0.12 K/uL    Immature Granulocytes (abs) 0.07 0.00 - 0.11 K/uL    " NRBC (Absolute) 0.00 K/uL   URINALYSIS    Specimen: Blood   Result Value Ref Range    Color Yellow     Character Clear     Specific Gravity 1.012 <1.035    Ph 7.5 5.0 - 8.0    Glucose Negative Negative mg/dL    Ketones Negative Negative mg/dL    Protein 30 (A) Negative mg/dL    Bilirubin Negative Negative    Urobilinogen, Urine 1.0 Negative    Nitrite Positive (A) Negative    Leukocyte Esterase Moderate (A) Negative    Occult Blood Small (A) Negative    Micro Urine Req Microscopic    URINE MICROSCOPIC (W/UA)   Result Value Ref Range    WBC  (A) /hpf    RBC 5-10 (A) /hpf    Bacteria Many (A) None /hpf    Epithelial Cells Negative /hpf    Hyaline Cast 3-5 (A) /lpf      DX-CHEST-PORTABLE (1 VIEW)   Final Result      No acute cardiac or pulmonary abnormalities are identified.      OUTSIDE IMAGES-CT ABDOMEN /PELVIS   Final Result          COURSE & MEDICAL DECISION MAKING  Pertinent Labs & Imaging studies reviewed. (See chart for details)  Septic protocol was initiated.  The patient here has leukocytosis what appears to be a infected urinalysis and a likely obstructing 7 mm stone on the left side with hydronephrosis.  Emergent consultation with Dr. Harry with urology was obtained.  Blood and urine cultures have been ordered.  IV fluids nausea and pain medication has been administered.  After blood cultures were obtained IV Rocephin was administered.  Patient will be kept nothing by mouth in anticipation of operative intervention for this obstructed and likely infected kidney stone later this evening.  Patient will be admitted to internal medicine for further evaluation and urological consultation and intervention    FINAL IMPRESSION  1.  Acute left-sided obstructed 7 mm kidney stone with urinary tract infection         Electronically signed by: Chalino Villarreal M.D., 1/2/2022 3:43 PM

## 2022-01-03 ENCOUNTER — PHARMACY VISIT (OUTPATIENT)
Dept: PHARMACY | Facility: MEDICAL CENTER | Age: 57
End: 2022-01-03
Payer: COMMERCIAL

## 2022-01-03 VITALS
SYSTOLIC BLOOD PRESSURE: 138 MMHG | HEART RATE: 87 BPM | WEIGHT: 253.75 LBS | BODY MASS INDEX: 40.78 KG/M2 | OXYGEN SATURATION: 94 % | HEIGHT: 66 IN | DIASTOLIC BLOOD PRESSURE: 74 MMHG | RESPIRATION RATE: 18 BRPM | TEMPERATURE: 99.1 F

## 2022-01-03 LAB
ALBUMIN SERPL BCP-MCNC: 4.3 G/DL (ref 3.2–4.9)
ALBUMIN/GLOB SERPL: 1.8 G/DL
ALP SERPL-CCNC: 42 U/L (ref 30–99)
ALT SERPL-CCNC: 33 U/L (ref 2–50)
ANION GAP SERPL CALC-SCNC: 12 MMOL/L (ref 7–16)
AST SERPL-CCNC: 22 U/L (ref 12–45)
BILIRUB SERPL-MCNC: 0.4 MG/DL (ref 0.1–1.5)
BUN SERPL-MCNC: 15 MG/DL (ref 8–22)
CALCIUM SERPL-MCNC: 8.5 MG/DL (ref 8.5–10.5)
CHLORIDE SERPL-SCNC: 104 MMOL/L (ref 96–112)
CO2 SERPL-SCNC: 25 MMOL/L (ref 20–33)
CREAT SERPL-MCNC: 0.65 MG/DL (ref 0.5–1.4)
ERYTHROCYTE [DISTWIDTH] IN BLOOD BY AUTOMATED COUNT: 42.8 FL (ref 35.9–50)
GLOBULIN SER CALC-MCNC: 2.4 G/DL (ref 1.9–3.5)
GLUCOSE SERPL-MCNC: 163 MG/DL (ref 65–99)
HCT VFR BLD AUTO: 40.5 % (ref 37–47)
HGB BLD-MCNC: 13.7 G/DL (ref 12–16)
LACTATE BLD-SCNC: 0.9 MMOL/L (ref 0.5–2)
LACTATE BLD-SCNC: 1 MMOL/L (ref 0.5–2)
MCH RBC QN AUTO: 31.1 PG (ref 27–33)
MCHC RBC AUTO-ENTMCNC: 33.8 G/DL (ref 33.6–35)
MCV RBC AUTO: 91.8 FL (ref 81.4–97.8)
PLATELET # BLD AUTO: 291 K/UL (ref 164–446)
PMV BLD AUTO: 9.6 FL (ref 9–12.9)
POTASSIUM SERPL-SCNC: 4 MMOL/L (ref 3.6–5.5)
PROCALCITONIN SERPL-MCNC: 0.1 NG/ML
PROT SERPL-MCNC: 6.7 G/DL (ref 6–8.2)
RBC # BLD AUTO: 4.41 M/UL (ref 4.2–5.4)
SODIUM SERPL-SCNC: 141 MMOL/L (ref 135–145)
WBC # BLD AUTO: 13.3 K/UL (ref 4.8–10.8)

## 2022-01-03 PROCEDURE — 83605 ASSAY OF LACTIC ACID: CPT | Mod: 91

## 2022-01-03 PROCEDURE — 700102 HCHG RX REV CODE 250 W/ 637 OVERRIDE(OP): Performed by: INTERNAL MEDICINE

## 2022-01-03 PROCEDURE — A9270 NON-COVERED ITEM OR SERVICE: HCPCS | Performed by: PHYSICIAN ASSISTANT

## 2022-01-03 PROCEDURE — 80053 COMPREHEN METABOLIC PANEL: CPT

## 2022-01-03 PROCEDURE — RXMED WILLOW AMBULATORY MEDICATION CHARGE: Performed by: INTERNAL MEDICINE

## 2022-01-03 PROCEDURE — 99239 HOSP IP/OBS DSCHRG MGMT >30: CPT | Performed by: INTERNAL MEDICINE

## 2022-01-03 PROCEDURE — 700111 HCHG RX REV CODE 636 W/ 250 OVERRIDE (IP): Performed by: INTERNAL MEDICINE

## 2022-01-03 PROCEDURE — A9270 NON-COVERED ITEM OR SERVICE: HCPCS | Performed by: INTERNAL MEDICINE

## 2022-01-03 PROCEDURE — 700105 HCHG RX REV CODE 258: Performed by: INTERNAL MEDICINE

## 2022-01-03 PROCEDURE — 700102 HCHG RX REV CODE 250 W/ 637 OVERRIDE(OP): Performed by: PHYSICIAN ASSISTANT

## 2022-01-03 PROCEDURE — 36415 COLL VENOUS BLD VENIPUNCTURE: CPT

## 2022-01-03 PROCEDURE — 85027 COMPLETE CBC AUTOMATED: CPT

## 2022-01-03 RX ORDER — PHENAZOPYRIDINE HYDROCHLORIDE 200 MG/1
100 TABLET, FILM COATED ORAL
Status: DISCONTINUED | OUTPATIENT
Start: 2022-01-03 | End: 2022-01-03 | Stop reason: HOSPADM

## 2022-01-03 RX ORDER — OXYCODONE HYDROCHLORIDE 5 MG/1
5 TABLET ORAL
Qty: 15 TABLET | Refills: 0 | Status: SHIPPED | OUTPATIENT
Start: 2022-01-03 | End: 2022-01-08

## 2022-01-03 RX ORDER — OXYBUTYNIN CHLORIDE 5 MG/1
5 TABLET ORAL 3 TIMES DAILY
Qty: 90 TABLET | Refills: 0 | Status: SHIPPED | OUTPATIENT
Start: 2022-01-03 | End: 2024-03-08

## 2022-01-03 RX ORDER — OXYBUTYNIN CHLORIDE 5 MG/1
5 TABLET ORAL 3 TIMES DAILY
Status: DISCONTINUED | OUTPATIENT
Start: 2022-01-03 | End: 2022-01-03 | Stop reason: HOSPADM

## 2022-01-03 RX ORDER — PHENAZOPYRIDINE HYDROCHLORIDE 100 MG/1
100 TABLET, FILM COATED ORAL
Qty: 6 TABLET | Refills: 0 | Status: ON HOLD | OUTPATIENT
Start: 2022-01-03 | End: 2022-01-18

## 2022-01-03 RX ORDER — CEFDINIR 300 MG/1
300 CAPSULE ORAL 2 TIMES DAILY
Qty: 26 CAPSULE | Refills: 0 | Status: SHIPPED | OUTPATIENT
Start: 2022-01-03 | End: 2022-01-16

## 2022-01-03 RX ORDER — ONDANSETRON 4 MG/1
4 TABLET, ORALLY DISINTEGRATING ORAL EVERY 4 HOURS PRN
Qty: 10 TABLET | Refills: 0 | Status: SHIPPED | OUTPATIENT
Start: 2022-01-03 | End: 2024-03-08

## 2022-01-03 RX ADMIN — SODIUM CHLORIDE: 9 INJECTION, SOLUTION INTRAVENOUS at 05:39

## 2022-01-03 RX ADMIN — PHENAZOPYRIDINE HYDROCHLORIDE 100 MG: 200 TABLET ORAL at 09:14

## 2022-01-03 RX ADMIN — DULOXETINE HYDROCHLORIDE 60 MG: 30 CAPSULE, DELAYED RELEASE ORAL at 05:33

## 2022-01-03 RX ADMIN — ACETAMINOPHEN 650 MG: 325 TABLET, FILM COATED ORAL at 08:33

## 2022-01-03 RX ADMIN — ESTRADIOL 2 MG: 1 TABLET ORAL at 05:34

## 2022-01-03 RX ADMIN — HEPARIN SODIUM 5000 UNITS: 5000 INJECTION, SOLUTION INTRAVENOUS; SUBCUTANEOUS at 05:34

## 2022-01-03 RX ADMIN — DOCUSATE SODIUM 50 MG AND SENNOSIDES 8.6 MG 2 TABLET: 8.6; 5 TABLET, FILM COATED ORAL at 05:33

## 2022-01-03 RX ADMIN — OXYBUTYNIN CHLORIDE 5 MG: 5 TABLET ORAL at 11:35

## 2022-01-03 RX ADMIN — CEFTRIAXONE SODIUM 2 G: 2 INJECTION, POWDER, FOR SOLUTION INTRAMUSCULAR; INTRAVENOUS at 05:34

## 2022-01-03 ASSESSMENT — ENCOUNTER SYMPTOMS
VOMITING: 0
CHILLS: 0
FLANK PAIN: 0
NAUSEA: 0
FEVER: 0

## 2022-01-03 ASSESSMENT — PAIN DESCRIPTION - PAIN TYPE: TYPE: ACUTE PAIN

## 2022-01-03 NOTE — ANESTHESIA PREPROCEDURE EVALUATION
Case: [id number] Date/Time: 01/02/22 1834    Procedure: CYSTOSCOPY, WITH URETERAL STENT INSERTION (Left Ureter)    Anesthesia type: General    Location: [facility] OR  / SURGERY [facility]    Surgeons: Jefe Harry M.D.      57 yo morbidly obese patient     P Med Hx:    P Surg Hx:    Non-smoker    Relevant Problems      (positive) Kidney stone on left side       Physical Exam    Airway   Mallampati: III  TM distance: >3 FB  Neck ROM: full       Cardiovascular - normal exam  Rhythm: regular  Rate: normal  (-) murmur     Dental - normal exam           Pulmonary - normal exam  Breath sounds clear to auscultation     Abdominal   (+) obese     Neurological - normal exam                 Anesthesia Plan    ASA 3   ASA physical status 3 criteria: morbid obesity - BMI greater than or equal to 40    Plan - general       Airway plan will be ETT          Induction: intravenous    Postoperative Plan: Postoperative administration of opioids is intended.    Pertinent diagnostic labs and testing reviewed    Informed Consent:    Anesthetic plan and risks discussed with patient.    Use of blood products discussed with: patient whom consented to blood products.

## 2022-01-03 NOTE — PROGRESS NOTES
Received report from previous shift RN  Assessment complete.  A&O x 4. Patient calls appropriately.  Patient ambulates with no assist.   Patient has 6/10 headache and stent discomfort. Pain improved with Tylenol   Denies N&V. NPO overnight, Regular diet started this morning.  + void, + flatus, PTA BM.  Patient denies SOB.  Review plan with of care with patient. Call light and personal belongings with in reach. Hourly rounding in place. All needs met at this time.

## 2022-01-03 NOTE — ASSESSMENT & PLAN NOTE
This is Sepsis Present on admission  SIRS criteria identified on my evaluation include: Tachycardia, with heart rate greater than 90 BPM and Leukocytosis, with WBC greater than 12,000  Source is pyelonephritis  Sepsis protocol initiated  Fluid resuscitation ordered per protocol  IV antibiotics as appropriate for source of sepsis  While organ dysfunction may be noted elsewhere in this problem list or in the chart, degree of organ dysfunction does not meet CMS criteria for severe sepsis

## 2022-01-03 NOTE — ANESTHESIA TIME REPORT
Anesthesia Start and Stop Event Times     Date Time Event    1/2/2022 1812 Ready for Procedure     1812 Anesthesia Start     1848 Anesthesia Stop        Responsible Staff  01/02/22    Name Role Begin End    Kenney Woods M.D. Anesth 1812 1848        Preop Diagnosis (Free Text):  Pre-op Diagnosis     Obstructing Left Ureteral Stone        Preop Diagnosis (Codes):    Premium Reason  E. Weekend    Comments:

## 2022-01-03 NOTE — PROGRESS NOTES
4 Eyes Skin Assessment Completed by MICHAEL Rees and MICHAEL Day.    Head WDL  Ears WDL  Nose WDL  Mouth WDL  Neck WDL  Breast/Chest WDL  Shoulder Blades WDL  Spine WDL  (R) Arm/Elbow/Hand WDL  (L) Arm/Elbow/Hand WDL  Abdomen WDL  Groin WDL  Scrotum/Coccyx/Buttocks WDL  (R) Leg WDL  (L) Leg WDL  (R) Heel/Foot/Toe WDL  (L) Heel/Foot/Toe WDL          Devices In Places Pulse Ox and Nasal Cannula      Interventions In Place N/A    Possible Skin Injury No    Pictures Uploaded Into Epic N/A  Wound Consult Placed N/A  RN Wound Prevention Protocol Ordered No

## 2022-01-03 NOTE — H&P
UROLOGY Consult Note:    Jefe Harry M.D.  Date & Time note created:    1/2/2022   5:15 PM       Patient ID:   Name:             Nunu Reyes   YOB: 1965  Age:                 56 y.o.  female   MRN:               [id number]                                                             Reason for Consult:      UTI, Obstructing L ureteral stone    History of Present Illness:    2-day history of left flank pain. Prompted visit to outside ER where CT abdomen pelvis was completed and revealed an obstructing 5 to 6 mm left UPJ stone with proximal hydronephrosis, images independently reviewed. Does have history of nephrolithiasis x1 with L ESWL. Has had episodes of urosepsis 2/2 pyelonephritis in the past. Urinalysis positive for nitrates and leukocyte esterase. Leukocytosis to 15, tachycardic, current temp 37.3. Sepsis protocol initiated in ED. Recommend urgent left ureteral stent for decompression.    Review of Systems:      As above all others negative           Past Medical History:   No past medical history on file.  Active Hospital Problems    Diagnosis     Sepsis (HCC) [A41.9]     Kidney stone on left side [N20.0]     Pyelonephritis [N12]     Leucocytosis [D72.829]        Past Surgical History:  No past surgical history on file.    Hospital Medications:    Current Facility-Administered Medications:     [MAR Hold] lactated ringers (LR) bolus, 1,000 mL, Intravenous, Once, Chalino Villarreal M.D.    [MAR Hold] DULoxetine (CYMBALTA) capsule 60 mg, 60 mg, Oral, DAILY, Christian Max M.D.    [MAR Hold] estradiol (ESTRACE) tablet 2 mg, 2 mg, Oral, DAILY, Christian Max M.D.    [MAR Hold] fenofibrate (TRICOR) tablet 145 mg, 145 mg, Oral, DAILY, Christian Max M.D.    [MAR Hold] tamsulosin (FLOMAX) capsule 0.4 mg, 0.4 mg, Oral, AFTER BREAKFAST, Christian Max M.D.    [MAR Hold] traZODone (DESYREL) tablet 100 mg, 100 mg, Oral, Nightly, Christian Max M.D.    [MAR Hold] senna-docusate (PERICOLACE or SENOKOT S) 8.6-50  MG per tablet 2 Tablet, 2 Tablet, Oral, BID **AND** [MAR Hold] polyethylene glycol/lytes (MIRALAX) PACKET 1 Packet, 1 Packet, Oral, QDAY PRN **AND** [MAR Hold] magnesium hydroxide (MILK OF MAGNESIA) suspension 30 mL, 30 mL, Oral, QDAY PRN **AND** [MAR Hold] bisacodyl (DULCOLAX) suppository 10 mg, 10 mg, Rectal, QDAY PRN, Christian Max M.D.    NS infusion, , Intravenous, Continuous, Christian Max M.D.    [MAR Hold] heparin injection 5,000 Units, 5,000 Units, Subcutaneous, Q8HRS, Christian Max M.D.    [MAR Hold] acetaminophen (Tylenol) tablet 650 mg, 650 mg, Oral, Q6HRS PRN, Christian Max M.D.    Notify provider if pain remains uncontrolled, , , CONTINUOUS **AND** Use the Numeric Rating Scale (NRS), Fernandez-Baker Faces (WBF), or FLACC on regular floors and Critical-Care Pain Observation Tool (CPOT) on ICUs/Trauma to assess pain, , , CONTINUOUS **AND** Pulse Ox, , , CONTINUOUS **AND** [MAR Hold] Pharmacy Consult Request ...Pain Management Review 1 Each, 1 Each, Other, PHARMACY TO DOSE **AND** If patient difficult to arouse and/or has respiratory depression (respiratory rate of 10 or less), stop any opiates that are currently infusing and call a Rapid Response., , , CONTINUOUS, Christian Max M.D.    [MAR Hold] oxyCODONE immediate-release (ROXICODONE) tablet 2.5 mg, 2.5 mg, Oral, Q3HRS PRN **OR** [MAR Hold] oxyCODONE immediate-release (ROXICODONE) tablet 5 mg, 5 mg, Oral, Q3HRS PRN **OR** [MAR Hold] morphine 4 MG/ML injection 2 mg, 2 mg, Intravenous, Q3HRS PRN, Christian Max M.D.    [MAR Hold] ondansetron (ZOFRAN) syringe/vial injection 4 mg, 4 mg, Intravenous, Q4HRS PRN, Christian Max M.D.    [MAR Hold] ondansetron (ZOFRAN ODT) dispertab 4 mg, 4 mg, Oral, Q4HRS PRN, Christian Max M.D.    [MAR Hold] promethazine (PHENERGAN) tablet 12.5-25 mg, 12.5-25 mg, Oral, Q4HRS PRN, Christian Max M.D.    [MAR Hold] promethazine (PHENERGAN) suppository 12.5-25 mg, 12.5-25 mg, Rectal, Q4HRS PRN, Christian Max M.D.    [MAR Hold] prochlorperazine (COMPAZINE)  injection 5-10 mg, 5-10 mg, Intravenous, Q4HRS PRN, Christian Max M.D.    [MAR Hold] cefTRIAXone (Rocephin) 2 g in  mL IVPB, 2 g, Intravenous, Q24HRS, Christian Max M.D.    Current Outpatient Medications:  Medications Prior to Admission   Medication Sig Dispense Refill Last Dose    DULoxetine (CYMBALTA) 60 MG Cap DR Particles delayed-release capsule Take 60 mg by mouth every day.   1/1/2022 at PM    estradiol (ESTRACE) 2 MG Tab Take 2 mg by mouth every day.   1/1/2022 at PM    fenofibrate (TRICOR) 145 MG Tab Take 145 mg by mouth every day.   1/1/2022 at PM    traZODone (DESYREL) 50 MG Tab Take 100 mg by mouth every evening.   1/1/2022 at PM    tamsulosin (FLOMAX) 0.4 MG capsule Take 0.4 mg by mouth 1/2 hour after breakfast.   1/1/2022 at PM       Medication Allergy:  No Known Allergies    Family History:  No family history on file.    Social History:  Social History     Socioeconomic History    Marital status:      Spouse name: Not on file    Number of children: Not on file    Years of education: Not on file    Highest education level: Not on file   Occupational History    Not on file   Tobacco Use    Smoking status: Not on file    Smokeless tobacco: Not on file   Substance and Sexual Activity    Alcohol use: Not on file    Drug use: Not on file    Sexual activity: Not on file   Other Topics Concern    Not on file   Social History Narrative    Not on file     Social Determinants of Health     Financial Resource Strain:     Difficulty of Paying Living Expenses: Not on file   Food Insecurity:     Worried About Running Out of Food in the Last Year: Not on file    Ran Out of Food in the Last Year: Not on file   Transportation Needs:     Lack of Transportation (Medical): Not on file    Lack of Transportation (Non-Medical): Not on file   Physical Activity:     Days of Exercise per Week: Not on file    Minutes of Exercise per Session: Not on file   Stress:     Feeling of Stress : Not on file   Social Connections:   "   Frequency of Communication with Friends and Family: Not on file    Frequency of Social Gatherings with Friends and Family: Not on file    Attends [employer]ze Services: Not on file    Active Member of Clubs or Organizations: Not on file    Attends Club or Organization Meetings: Not on file    Marital Status: Not on file   Intimate Partner Violence:     Fear of Current or Ex-Partner: Not on file    Emotionally Abused: Not on file    Physically Abused: Not on file    Sexually Abused: Not on file   Housing Stability:     Unable to Pay for Housing in the Last Year: Not on file    Number of Places Lived in the Last Year: Not on file    Unstable Housing in the Last Year: Not on file         Physical Exam:  Vitals/ General Appearance:   Weight/BMI: Body mass index is 40.42 kg/m².  /78   Pulse (!) 101   Temp 37.3 °C (99.1 °F) (Temporal)   Resp (!) 22   Ht 1.676 m (5' 6\")   Wt 114 kg (250 lb 7.1 oz)   SpO2 92%   Vitals:    01/02/22 1319 01/02/22 1326 01/02/22 1600   BP: 152/95  132/78   Pulse: (!) 105  (!) 101   Resp: (!) 22     Temp: 37.3 °C (99.1 °F)     TempSrc: Temporal     SpO2: 92%  92%   Weight:  114 kg (250 lb 7.1 oz)    Height:  1.676 m (5' 6\")      Oxygen Therapy:  Pulse Oximetry: 92 %    Constitutional:   Well developed, Well nourished, No acute distress  HENMT:  Normocephalic, Atraumatic  Eyes:  No discharge.  Neck:  Normal range of motion  Cardiovascular:  Normal heart rate  Lungs:  equal chest rise   Abdomen: BSoft, No tenderness, No guarding, No rebound  : Left flank pain  Skin: Warm, Dry  Neurologic: Alert & oriented x 3  Psychiatric: Affect normal      MDM (Data Review):     Records reviewed and summarized in current documentation    Lab Data Review:  Recent Results (from the past 24 hour(s))   URINALYSIS    Collection Time: 01/02/22  1:38 PM    Specimen: Blood   Result Value Ref Range    Color Yellow     Character Clear     Specific Gravity 1.012 <1.035    Ph 7.5 5.0 - 8.0    Glucose Negative " Negative mg/dL    Ketones Negative Negative mg/dL    Protein 30 (A) Negative mg/dL    Bilirubin Negative Negative    Urobilinogen, Urine 1.0 Negative    Nitrite Positive (A) Negative    Leukocyte Esterase Moderate (A) Negative    Occult Blood Small (A) Negative    Micro Urine Req Microscopic    URINE MICROSCOPIC (W/UA)    Collection Time: 01/02/22  1:38 PM   Result Value Ref Range    WBC  (A) /hpf    RBC 5-10 (A) /hpf    Bacteria Many (A) None /hpf    Epithelial Cells Negative /hpf    Hyaline Cast 3-5 (A) /lpf   CBC WITH DIFFERENTIAL    Collection Time: 01/02/22  2:45 PM   Result Value Ref Range    WBC 15.1 (H) 4.8 - 10.8 K/uL    RBC 4.82 4.20 - 5.40 M/uL    Hemoglobin 14.6 12.0 - 16.0 g/dL    Hematocrit 44.0 37.0 - 47.0 %    MCV 91.3 81.4 - 97.8 fL    MCH 30.3 27.0 - 33.0 pg    MCHC 33.2 (L) 33.6 - 35.0 g/dL    RDW 42.1 35.9 - 50.0 fL    Platelet Count 318 164 - 446 K/uL    MPV 9.5 9.0 - 12.9 fL    Neutrophils-Polys 81.60 (H) 44.00 - 72.00 %    Lymphocytes 10.70 (L) 22.00 - 41.00 %    Monocytes 6.20 0.00 - 13.40 %    Eosinophils 0.50 0.00 - 6.90 %    Basophils 0.50 0.00 - 1.80 %    Immature Granulocytes 0.50 0.00 - 0.90 %    Nucleated RBC 0.00 /100 WBC    Neutrophils (Absolute) 12.31 (H) 2.00 - 7.15 K/uL    Lymphs (Absolute) 1.62 1.00 - 4.80 K/uL    Monos (Absolute) 0.94 (H) 0.00 - 0.85 K/uL    Eos (Absolute) 0.07 0.00 - 0.51 K/uL    Baso (Absolute) 0.07 0.00 - 0.12 K/uL    Immature Granulocytes (abs) 0.07 0.00 - 0.11 K/uL    NRBC (Absolute) 0.00 K/uL   COV-2, FLU A/B, AND RSV BY PCR (2-4 HOURS CEPHEID): Collect NP swab in VTM    Collection Time: 01/02/22  4:38 PM    Specimen: Nasopharyngeal; Respirate   Result Value Ref Range    SARS-CoV-2 Source NP Swab        Imaging/Procedures Review:    Reviewed    MDM (Assessment and Plan):     Active Hospital Problems    Diagnosis     Sepsis (HCC) [A41.9]     Kidney stone on left side [N20.0]     Pyelonephritis [N12]     Leucocytosis [D72.829]      Plan for left  ureteral stent placement

## 2022-01-03 NOTE — H&P
Hospital Medicine History & Physical Note    Date of Service  1/2/2022    Primary Care Physician  Pcp Pt States None    Consultants  urology    Specialist Names: Dr. Harry    Code Status  Full Code    Chief Complaint  Chief Complaint   Patient presents with   • Flank Pain     L sided, started yesterday, colicky, radiating into L abdomen, hx kidney stones. Able to urinate. Seen at [facility] yesterday & given fluids, pain maids & tamulosin. CT shoing 3 stones, pt unsure if obstructing. Told to come to ED in order to see urologist.       History of Presenting Illness  Nunu Reyes is a 56 y.o. female who presented 1/2/2022 with left-sided flank pain since yesterday.  She has history of kidney stone about 10 years ago.  She initially presented to outside facility where she was found to have left kidney stone and she was subsequently transferred here.  She described the pain is dull aching pain, sudden onset, 10 out of 10 intensity, radiated to her left groin area, intermittent in nature.  In the ER she was found to have sepsis related to pyelonephritis and sepsis protocol was initiated and IV antibiotic was given.  Urology was consulted and planning to take her to the OR later today..    I discussed the plan of care with patient and bedside RN.    Review of Systems  Review of Systems   Constitutional: Negative for chills, fever and weight loss.   HENT: Negative for congestion, hearing loss, nosebleeds, sinus pain and sore throat.    Eyes: Negative for blurred vision, pain, discharge and redness.   Respiratory: Negative for cough, sputum production, shortness of breath, wheezing and stridor.    Cardiovascular: Negative for chest pain, palpitations, orthopnea and claudication.   Gastrointestinal: Negative for abdominal pain, blood in stool, constipation, diarrhea, heartburn, nausea and vomiting.   Genitourinary: Positive for flank pain. Negative for dysuria, frequency, hematuria and urgency.   Musculoskeletal:  Negative for back pain, myalgias and neck pain.   Skin: Negative for itching and rash.   Neurological: Negative for dizziness, focal weakness, seizures and headaches.   Psychiatric/Behavioral: Negative for depression. The patient is not nervous/anxious and does not have insomnia.        Past Medical History  Morbid obesity    Surgical His reviewed and no pertinent past surgical historytory       Family History     Family history reviewed with patient. There is no family history that is pertinent to the chief complaint.     Social History       Allergies  Not on File    Medications  Prior to Admission Medications   Prescriptions Last Dose Informant Patient Reported? Taking?   DULoxetine (CYMBALTA) 60 MG Cap DR Particles delayed-release capsule 1/1/2022 at Unknown time  Yes Yes   Sig: Take 60 mg by mouth every day.   estradiol (ESTRACE) 2 MG Tab 1/1/2022 at Unknown time  Yes Yes   Sig: Take 2 mg by mouth every day.   fenofibrate (TRICOR) 145 MG Tab 1/1/2022 at Unknown time  Yes Yes   Sig: Take 145 mg by mouth every day.   tamsulosin (FLOMAX) 0.4 MG capsule 1/1/2022 at Unknown time  Yes Yes   Sig: Take 0.4 mg by mouth 1/2 hour after breakfast.   traZODone (DESYREL) 50 MG Tab 1/1/2022 at Unknown time  Yes Yes   Sig: Take 100 mg by mouth every evening.      Facility-Administered Medications: None       Physical Exam  Temp:  [37.3 °C (99.1 °F)] 37.3 °C (99.1 °F)  Pulse:  [101-105] 101  Resp:  [22] 22  BP: (132-152)/(78-95) 132/78  SpO2:  [92 %] 92 %  Blood Pressure: 132/78   Temperature: 37.3 °C (99.1 °F)   Pulse: (!) 101   Respiration: (!) 22   Pulse Oximetry: 92 %       Physical Exam  Vitals reviewed.   Constitutional:       General: She is not in acute distress.     Appearance: Normal appearance. She is normal weight. She is not ill-appearing, toxic-appearing or diaphoretic.   HENT:      Head: Normocephalic and atraumatic.      Right Ear: Tympanic membrane, ear canal and external ear normal.      Left Ear: Tympanic  membrane, ear canal and external ear normal.      Nose: No congestion or rhinorrhea.   Eyes:      Extraocular Movements: Extraocular movements intact.      Conjunctiva/sclera: Conjunctivae normal.      Pupils: Pupils are equal, round, and reactive to light.   Cardiovascular:      Rate and Rhythm: Normal rate and regular rhythm.      Pulses: Normal pulses.      Heart sounds: Normal heart sounds. No murmur heard.  No friction rub. No gallop.    Pulmonary:      Effort: Pulmonary effort is normal. No respiratory distress.      Breath sounds: Normal breath sounds. No stridor. No wheezing or rhonchi.   Abdominal:      General: Bowel sounds are normal. There is no distension.      Palpations: Abdomen is soft. There is no mass.      Tenderness: There is no abdominal tenderness. There is no guarding or rebound.      Hernia: No hernia is present.   Musculoskeletal:         General: No swelling or tenderness.      Cervical back: Normal range of motion and neck supple.   Skin:     General: Skin is warm.      Findings: No erythema.   Neurological:      General: No focal deficit present.      Mental Status: She is alert and oriented to person, place, and time.         Laboratory:  Recent Labs     01/02/22  1445   WBC 15.1*   RBC 4.82   HEMOGLOBIN 14.6   HEMATOCRIT 44.0   MCV 91.3   MCH 30.3   MCHC 33.2*   RDW 42.1   PLATELETCT 318   MPV 9.5         No results for input(s): ALTSGPT, ASTSGOT, ALKPHOSPHAT, TBILIRUBIN, DBILIRUBIN, GAMMAGT, AMYLASE, LIPASE, ALB, PREALBUMIN, GLUCOSE in the last 72 hours.      No results for input(s): NTPROBNP in the last 72 hours.      No results for input(s): TROPONINT in the last 72 hours.    Imaging:  DX-CHEST-PORTABLE (1 VIEW)   Final Result      No acute cardiac or pulmonary abnormalities are identified.      OUTSIDE IMAGES-CT ABDOMEN /PELVIS   Final Result              Assessment/Plan:  I anticipate this patient will require at least two midnights for appropriate medical management, necessitating  inpatient admission.    * Sepsis (HCC)- (present on admission)  Assessment & Plan  This is Sepsis Present on admission  SIRS criteria identified on my evaluation include: Tachycardia, with heart rate greater than 90 BPM and Leukocytosis, with WBC greater than 12,000  Source is pyelonephritis  Sepsis protocol initiated  Fluid resuscitation ordered per protocol  IV antibiotics as appropriate for source of sepsis  While organ dysfunction may be noted elsewhere in this problem list or in the chart, degree of organ dysfunction does not meet CMS criteria for severe sepsis          Leucocytosis  Assessment & Plan  Related to above    Pyelonephritis  Assessment & Plan  On iv ceftriaxone  Follow culture    Kidney stone on left side- (present on admission)  Assessment & Plan  7mm obstructing stone from outside ultrasound  NPO  Pain control  Urology on      VTE prophylaxis: heparin ppx

## 2022-01-03 NOTE — PROGRESS NOTES
Patient discharged to home with family and staff escort. IV discontinued. Prescriptions picked up by . Discharge instructions given. Education provided, patient asked questions and verbalized understanding. Discharge paperwork signed and in chart. Patient is tolerating diet, stable when ambulating, and pain is well controlled. All belongings collected. No further questions or concerns at this time.

## 2022-01-03 NOTE — DISCHARGE SUMMARY
Discharge Summary    CHIEF COMPLAINT ON ADMISSION  Chief Complaint   Patient presents with   • Flank Pain     L sided, started yesterday, colicky, radiating into L abdomen, hx kidney stones. Able to urinate. Seen at [facility] yesterday & given fluids, pain maids & tamulosin. CT shoing 3 stones, pt unsure if obstructing. Told to come to ED in order to see urologist.       Reason for Admission  Flank Pain     Admission Date  1/2/2022    CODE STATUS  Full Code    HPI & HOSPITAL COURSE  This is a 56 y.o. female here with above medical issues. She was found to have a concurrent complicated UTI as well. Admitted to the general surgical floor and placed on empiric IV ceftriaxone. Urology was consulted and underwent the following procedure:    Cystoscopy with ureteral stent insertion.     She tolerated the procedure well. Urine cultures are pending at this time and blood cultures are no growth to date. Post operative day #1 she maintained clinical stability with good improvement in her pain control and urinary function. She was medically cleared to go home. She will need an additional 13 days of abx's as outlined below to complete a 14 day course and close follow up with urology to get ureteral stent removed and concurrent lithotripsy.     No notes on file    Therefore, she is discharged in fair and stable condition to home with close outpatient follow-up.    The patient recovered much more quickly than anticipated on admission.    Discharge Date  1/3/2022    FOLLOW UP ITEMS POST DISCHARGE  As outlined above    DISCHARGE DIAGNOSES  Principal Problem:    Sepsis (HCC) POA: Yes  Active Problems:    Kidney stone on left side POA: Yes    Pyelonephritis POA: Yes    Leucocytosis POA: Yes  Resolved Problems:    * No resolved hospital problems. *      FOLLOW UP  No future appointments.  No follow-up provider specified.    MEDICATIONS ON DISCHARGE     Medication List      START taking these medications      Instructions    cefdinir 300 MG Caps  Commonly known as: OMNICEF   Take 1 Capsule by mouth 2 times a day for 13 days.  Dose: 300 mg     ondansetron 4 MG Tbdp  Commonly known as: ZOFRAN ODT   Take 1 Tablet by mouth every four hours as needed for Nausea.  Dose: 4 mg     oxybutynin 5 MG Tabs  Commonly known as: DITROPAN   Take 1 Tablet by mouth 3 times a day.  Dose: 5 mg     oxyCODONE immediate-release 5 MG Tabs  Commonly known as: ROXICODONE   Take 1 Tablet by mouth every 3 hours as needed for up to 5 days.  Dose: 5 mg     phenazopyridine 100 MG Tabs  Commonly known as: PYRIDIUM   Take 1 Tablet by mouth 3 times a day with meals.  Dose: 100 mg        CONTINUE taking these medications      Instructions   DULoxetine 60 MG Cpep delayed-release capsule  Commonly known as: CYMBALTA   Take 60 mg by mouth every day.  Dose: 60 mg     estradiol 2 MG Tabs  Commonly known as: ESTRACE   Take 2 mg by mouth every day.  Dose: 2 mg     fenofibrate 145 MG Tabs  Commonly known as: TRICOR   Take 145 mg by mouth every day.  Dose: 145 mg     tamsulosin 0.4 MG capsule  Commonly known as: FLOMAX   Take 0.4 mg by mouth 1/2 hour after breakfast.  Dose: 0.4 mg     traZODone 50 MG Tabs  Commonly known as: DESYREL   Take 100 mg by mouth every evening.  Dose: 100 mg            Allergies  Allergies   Allergen Reactions   • Macrobid [Nitrofurantoin] Anaphylaxis       DIET  Orders Placed This Encounter   Procedures   • Diet Order Diet: Regular     Standing Status:   Standing     Number of Occurrences:   1     Order Specific Question:   Diet:     Answer:   Regular [1]       ACTIVITY  As tolerated.  Weight bearing as tolerated    CONSULTATIONS  Urology    PROCEDURES  As noted above    DX-CYSTO FLUORO > 1 HOUR   Final Result      Cysto fluoroscopy utilized for ureteral stent placement         INTERPRETING LOCATION: [address] [place] [address], [id number]      DX-CHEST-PORTABLE (1 VIEW)   Final Result      No acute cardiac or pulmonary abnormalities are identified.      OUTSIDE  IMAGES-CT ABDOMEN /PELVIS   Final Result            LABORATORY  Lab Results   Component Value Date    SODIUM 141 01/03/2022    POTASSIUM 4.0 01/03/2022    CHLORIDE 104 01/03/2022    CO2 25 01/03/2022    GLUCOSE 163 (H) 01/03/2022    BUN 15 01/03/2022    CREATININE 0.65 01/03/2022        Lab Results   Component Value Date    WBC 13.3 (H) 01/03/2022    HEMOGLOBIN 13.7 01/03/2022    HEMATOCRIT 40.5 01/03/2022    PLATELETCT 291 01/03/2022        Total time of the discharge process exceeds 34 minutes.

## 2022-01-03 NOTE — ANESTHESIA POSTPROCEDURE EVALUATION
Patient: Nunu Reyes    Procedure Summary     Date: 01/02/22 Room / Location: [facility] 05 / SURGERY [facility]    Anesthesia Start: 1812 Anesthesia Stop: 1848    Procedure: CYSTOSCOPY, WITH URETERAL STENT INSERTION (Left Ureter) Diagnosis: (Obstructing Left Ureteral Stone)    Surgeons: Jefe Harry M.D. Responsible Provider: Kenney Woods M.D.    Anesthesia Type: general ASA Status: 3          Final Anesthesia Type: general  Last vitals  BP   Blood Pressure: 150/72    Temp   36.7 °C (98 °F)    Pulse   (!) 107   Resp   (!) 31    SpO2   95 %      Anesthesia Post Evaluation    Patient location during evaluation: PACU  Patient participation: complete - patient participated  Level of consciousness: awake and alert  Pain score: 1    Airway patency: patent  Anesthetic complications: no  Cardiovascular status: hemodynamically stable  Respiratory status: acceptable  Hydration status: euvolemic    PONV: none          No complications documented.     Nurse Pain Score: 0 (NPRS)

## 2022-01-03 NOTE — PROGRESS NOTES
Note to reader: this note follows the APSO format rather than the historical SOAP format. Assessment and plan located at the top of the note for ease of use.    Chief Complaint  56 y.o. year old female here with left ureteral stone, left obstructive pyelonephritis.     Assessment/Plan  Interval History   56 y.o. year old female here with left ureteral stone, left obstructive pyelonephritis s/p placement of left ureteral stent on 1/2/22.     Plan:  - Added oxybutynin and pyridium for stent irritation  - Will need 2 weeks oral antibiotics once discharged  - Will plan for L CULTS in 2 weeks. Our office will arrange.   - Urology signing off. We do not anticipate further surgical intervention at this time    Patient seen and examined    1/3. POD 1. C/o ureteral stent irritation and headache. Has not yet eaten this morning. Has been up to bathroom and steady on feet. Pain not well controlled at this time. Afebrile. WBC trending down but still elevated.              Review of Systems  Physical Exam   Review of Systems   Constitutional: Negative for chills and fever.   Gastrointestinal: Negative for nausea and vomiting.   Genitourinary: Positive for dysuria, frequency and urgency. Negative for flank pain and hematuria.     Vitals:    01/02/22 2231 01/03/22 0048 01/03/22 0348 01/03/22 0809   BP: 104/61 (!) 96/52 113/57 129/70   Pulse: 72 62 72 78   Resp: 18 15 16 18   Temp: 37.5 °C (99.5 °F) 36.6 °C (97.9 °F) 36.6 °C (97.9 °F) 37.3 °C (99.1 °F)   TempSrc: Axillary Axillary Temporal Temporal   SpO2: 93% 95% 95% 94%   Weight:       Height:         Physical Exam  Vitals and nursing note reviewed.   Constitutional:       Appearance: Normal appearance.   HENT:      Head: Normocephalic and atraumatic.   Pulmonary:      Effort: Pulmonary effort is normal.   Neurological:      General: No focal deficit present.      Mental Status: She is alert and oriented to person, place, and time.   Psychiatric:         Mood and Affect: Mood  normal.         Behavior: Behavior normal.          Hematology Chemistry   Lab Results   Component Value Date/Time    WBC 13.3 (H) 01/03/2022 12:19 AM    HEMOGLOBIN 13.7 01/03/2022 12:19 AM    HEMATOCRIT 40.5 01/03/2022 12:19 AM    PLATELETCT 291 01/03/2022 12:19 AM     Lab Results   Component Value Date/Time    SODIUM 141 01/03/2022 12:19 AM    POTASSIUM 4.0 01/03/2022 12:19 AM    CHLORIDE 104 01/03/2022 12:19 AM    CO2 25 01/03/2022 12:19 AM    GLUCOSE 163 (H) 01/03/2022 12:19 AM    BUN 15 01/03/2022 12:19 AM    CREATININE 0.65 01/03/2022 12:19 AM         Labs not explicitly included in this progress note were reviewed by the author.   Radiology/imaging not explicitly included in this progress note was reviewed by the author.     Labs reviewed and Medications reviewed

## 2022-01-03 NOTE — CARE PLAN
The patient is Stable - Low risk of patient condition declining or worsening    Shift Goals  Clinical Goals: Diet order, pain control    Progress made toward(s) clinical / shift goals:  Pt tolerating regular diet, pain controlled. Plan to d/c today     Patient is not progressing towards the following goals:

## 2022-01-03 NOTE — DISCHARGE INSTRUCTIONS
Discharge Instructions    Discharged to home by car with relative. Discharged via wheelchair, hospital escort: Yes.  Special equipment needed: Not Applicable    Be sure to schedule a follow-up appointment with your primary care doctor or any specialists as instructed.     Discharge Plan:   Diet Plan: Discussed  Activity Level: Discussed  Confirmed Follow up Appointment: Patient to Call and Schedule Appointment  Confirmed Symptoms Management: Discussed  Medication Reconciliation Updated: Yes  Influenza Vaccine Indication: Patient Refuses    I understand that a diet low in cholesterol, fat, and sodium is recommended for good health. Unless I have been given specific instructions below for another diet, I accept this instruction as my diet prescription.   Other diet: regular    Special Instructions: None    · Is patient discharged on Warfarin / Coumadin?   No     Depression / Suicide Risk    As you are discharged from this [facility] facility, it is important to learn how to keep safe from harming yourself.    Recognize the warning signs:  · Abrupt changes in personality, positive or negative- including increase in energy   · Giving away possessions  · Change in eating patterns- significant weight changes-  positive or negative  · Change in sleeping patterns- unable to sleep or sleeping all the time   · Unwillingness or inability to communicate  · Depression  · Unusual sadness, discouragement and loneliness  · Talk of wanting to die  · Neglect of personal appearance   · Rebelliousness- reckless behavior  · Withdrawal from people/activities they love  · Confusion- inability to concentrate     If you or a loved one observes any of these behaviors or has concerns about self-harm, here's what you can do:  · Talk about it- your feelings and reasons for harming yourself  · Remove any means that you might use to hurt yourself (examples: pills, rope, extension cords, firearm)  · Get professional help from the community  (Mental Health, Substance Abuse, psychological counseling)  · Do not be alone:Call your Safe Contact- someone whom you trust who will be there for you.  · Call your local CRISIS HOTLINE 759-8350 or 585-244-0240  · Call your local Children's Mobile Crisis Response Team [place] (887) 499-0966 or www.Fundrise  · Call the toll free National Suicide Prevention Hotlines   · National Suicide Prevention Lifeline 899-303-DDLV (5570)  · [employer] Hope Line Network 800-SUICIDE (714-2871)    Kidney Stones    Kidney stones (urolithiasis) are rock-like masses that form inside of the kidneys. Kidneys are organs that make pee (urine). A kidney stone can cause very bad pain and can block the flow of pee. The stone usually leaves your body (passes) through your pee. You may need to have a doctor take out the stone.  Follow these instructions at home:  Eating and drinking  · Drink enough fluid to keep your pee clear or pale yellow. This will help you pass the stone.  · If told by your doctor, change the foods you eat (your diet). This may include:  ? Limiting how much salt (sodium) you eat.  ? Eating more fruits and vegetables.  ? Limiting how much meat, poultry, fish, and eggs you eat.  · Follow instructions from your doctor about eating or drinking restrictions.  General instructions  · Collect pee samples as told by your doctor. You may need to collect a pee sample:  ? 24 hours after a stone comes out.  ? 8-12 weeks after a stone comes out, and every 6-12 months after that.  · Strain your pee every time you pee (urinate), for as long as told. Use the strainer that your doctor recommends.  · Do not throw out the stone. Keep it so that it can be tested by your doctor.  · Take over-the-counter and prescription medicines only as told by your doctor.  · Keep all follow-up visits as told by your doctor. This is important. You may need follow-up tests.  Preventing kidney stones  To prevent another kidney stone:  · Drink  enough fluid to keep your pee clear or pale yellow. This is the best way to prevent kidney stones.  · Eat healthy foods.  · Avoid certain foods as told by your doctor. You may be told to eat less protein.  · Stay at a healthy weight.  Contact a doctor if:  · You have pain that gets worse or does not get better with medicine.  Get help right away if:  · You have a fever or chills.  · You get very bad pain.  · You get new pain in your belly (abdomen).  · You pass out (faint).  · You cannot pee.  This information is not intended to replace advice given to you by your health care provider. Make sure you discuss any questions you have with your health care provider.  Document Released: 06/05/2009 Document Revised: 07/30/2019 Document Reviewed: 09/05/2017  [employer] Patient Education © 2020 [employer]    Ureteral Stent Implantation, Care After  This sheet gives you information about how to care for yourself after your procedure. Your health care provider may also give you more specific instructions. If you have problems or questions, contact your health care provider.  What can I expect after the procedure?  After the procedure, it is common to have:  · Nausea.  · Mild pain when you urinate. You may feel this pain in your lower back or lower abdomen. The pain should stop within a few minutes after you urinate. This may last for up to 1 week.  · A small amount of blood in your urine for several days.  Follow these instructions at home:  Medicines  · Take over-the-counter and prescription medicines only as told by your health care provider.  · If you were prescribed an antibiotic medicine, take it as told by your health care provider. Do not stop taking the antibiotic even if you start to feel better.  · Do not drive for 24 hours if you were given a sedative during your procedure.  · Ask your health care provider if the medicine prescribed to you requires you to avoid driving or using heavy machinery.  Activity  · Rest as  told by your health care provider.  · Avoid sitting for a long time without moving. Get up to take short walks every 1-2 hours. This is important to improve blood flow and breathing. Ask for help if you feel weak or unsteady.  · Return to your normal activities as told by your health care provider. Ask your health care provider what activities are safe for you.  General instructions    · Watch for any blood in your urine. Call your health care provider if the amount of blood in your urine increases.  · If you have a catheter:  ? Follow instructions from your health care provider about taking care of your catheter and collection bag.  ? Do not take baths, swim, or use a hot tub until your health care provider approves. Ask your health care provider if you may take showers. You may only be allowed to take sponge baths.  · Drink enough fluid to keep your urine pale yellow.  · Do not use any products that contain nicotine or tobacco, such as cigarettes, e-cigarettes, and chewing tobacco. These can delay healing after surgery. If you need help quitting, ask your health care provider.  · Keep all follow-up visits as told by your health care provider. This is important.  Contact a health care provider if:  · You have pain that gets worse or does not get better with medicine, especially pain when you urinate.  · You have difficulty urinating.  · You feel nauseous or you vomit repeatedly during a period of more than 2 days after the procedure.  Get help right away if:  · Your urine is dark red or has blood clots in it.  · You are leaking urine (have incontinence).  · The end of the stent comes out of your urethra.  · You cannot urinate.  · You have sudden, sharp, or severe pain in your abdomen or lower back.  · You have a fever.  · You have swelling or pain in your legs.  · You have difficulty breathing.  Summary  · After the procedure, it is common to have mild pain when you urinate that goes away within a few minutes after  you urinate. This may last for up to 1 week.  · Watch for any blood in your urine. Call your health care provider if the amount of blood in your urine increases.  · Take over-the-counter and prescription medicines only as told by your health care provider.  · Drink enough fluid to keep your urine pale yellow.  This information is not intended to replace advice given to you by your health care provider. Make sure you discuss any questions you have with your health care provider.  Document Released: 08/20/2014 Document Revised: 09/24/2019 Document Reviewed: 09/25/2019  [employer] Patient Education © 2020 [employer]

## 2022-01-03 NOTE — ANESTHESIA PROCEDURE NOTES
Airway    Date/Time: 1/2/2022 6:21 PM  Performed by: Kenney Woods M.D.  Authorized by: Kenney Woods M.D.     Location:  OR  Urgency:  Elective  Indications for Airway Management:  Anesthesia      Spontaneous Ventilation: absent    Sedation Level:  Deep  Preoxygenated: Yes    Patient Position:  Sniffing  Mask Difficulty Assessment:  1 - vent by mask  Final Airway Type:  Endotracheal airway  Final Endotracheal Airway:  ETT  Cuffed: Yes    Technique Used for Successful ETT Placement:  Direct laryngoscopy  Devices/Methods Used in Placement:  Cricoid pressure    Insertion Site:  Oral  Blade Type:  [place]  Laryngoscope Blade/Videolaryngoscope Blade Size:  3  ETT Size (mm):  7.0  Measured from:  Lips  ETT to Lips (cm):  21  Placement Verified by: auscultation and capnometry    Cormack-Lehane Classification:  Grade I - full view of glottis  Number of Attempts at Approach:  1

## 2022-01-03 NOTE — PROGRESS NOTES
Bedside report received.  Assessment complete.  A&O x 4. Patient calls appropriately.  Patient ambulates with standby assist. Bed alarm off.   Patient has 0/10 pain. Pain managed with prescribed medications.  Denies N&V. Tolerating NPO diet.  Voided x2 overnight.  Patient denies SOB.  SCD's on.  Review plan with of care with patient. Call light and personal belongings within reach. Hourly rounding in place. All needs met at this time.

## 2022-01-03 NOTE — OR NURSING
"1843: Pt arrived from OR, handoff received from anesthesiologist and RN. Pt with dry non productive cough upon arrival - reports it is her \"post nasal drip.\" Sats maintained in mid 90s.    1857: Call made to patient's  to update patient status.     1905: Handoff to MICHAEL Mejia.   "

## 2022-01-03 NOTE — OR NURSING
1905 Report taken from day RN, pt resting comfortably/denies pain. Vitals WNL.    2000 Report called to floor RN, pt  updated. Awaiting transport to floor.

## 2022-01-03 NOTE — OP REPORT
Pre-operative Diagnosis: 1. L ureteral stone  2. L Obstructive pyelonephritis   Post-operative Diagnosis: Same as above   Procedure: CYSTOSCOPY, WITH URETERAL STENT INSERTION: 69157 (CPT®)   Surgeon: Jefe Harry M.D., MD   OR staff: Circulator: Rl Foreman R.N.  Scrub Person: Geeta Kohler    Anesthesia: Anesthesiologist: Kenney Woods M.D.  General   Estimated Blood Loss: minimal       Specimens: none   Drains: 1. [id number] JJ stent OFF strings   Complications: None   Wound class II clean contaminated   Condition: Stable, procedure well tolerated    Disposition:  PACU   Findings: Turbid uop following stent placement     Indications for Procedure:  56 y.o. female with hx of 7mm  Stone at the L UPJ.  Her WBC was 15, and her. UA was  suspicious for an infection.  she had no fevers in the ED.  Because of concern for an infected stone, the decision was made to take the patinet for urgent cystoscopy with stenting of the affected side.  After a full discussion of alternatives, risks, and benefits the patient consented to proceeding with cystoscopy and JJ stent insertion on respective side.  She understands the risk of inability to access ureter, the need for second procedures, that she may have stent discomfort until this is removed, bleeding, infection, severe sepsis ureteral injury or stricture, bladder injury, post op urinary retention requiring blackwell catheter, and the general pulmonary and cardiovascular risks associated with anesthesia.     Procedure in Detail:  After obtaining informed consent patient was brought to room, and placed under general anesthesia. She was then placed in the lithotomy position and prepped and draped in the usual sterile fashion.  The patient received ancef for perioperative antiobiotics. SCDs were placed for DVT prophylaxis    Cystoscopy was then performed with 21Fr scope, 30 degree lens. The anterior urethra was normal. The ureters were noted to be in orthotopic position. The  bladder was normal in appearance. There was no bladder stones, significant cloudiness, or puss noted from the ureters.  We then advanced a access wire up the L ureter to the level of the renal pelvis.  A [id number] stent withOUT strings was placed under direct vision in the bladder.  Fluoroscopically the stent was noted to have a nice curl in the renal pelvis.  A nice curl was visualized in the bladder endoscopically. Turbid efflux noted after placement.     The case was terminated at this point the patient awoken from anesthesia and brought to the PACU in stable condition.

## 2022-01-03 NOTE — PROGRESS NOTES
To whom this may concern,    Please excuse Nunu Reyes from her work duties for the week of 1/3/2022 due to medical issues requiring hospitalization. She can return to work on Monday, 1/10/2022.    If you have any questions, please contact me at [phone].      Sincerely,            Aniket Lai MD

## 2022-01-03 NOTE — DIETARY
NUTRITION SERVICES: BMI - Pt with BMI >40 (=Body mass index is 40.96 kg/m².), Class III obesity. Weight loss counseling not appropriate in acute care setting. RECOMMEND - If appropriate at DC please refer to outpatient nutrition services for weight management.     RD available PRN.

## 2022-01-07 LAB
BACTERIA BLD CULT: NORMAL
BACTERIA BLD CULT: NORMAL
BACTERIA UR CULT: ABNORMAL
SIGNIFICANT IND 70042: ABNORMAL
SIGNIFICANT IND 70042: NORMAL
SIGNIFICANT IND 70042: NORMAL
SITE SITE: ABNORMAL
SITE SITE: NORMAL
SITE SITE: NORMAL
SOURCE SOURCE: ABNORMAL
SOURCE SOURCE: NORMAL
SOURCE SOURCE: NORMAL

## 2022-01-12 ENCOUNTER — PRE-ADMISSION TESTING (OUTPATIENT)
Dept: ADMISSIONS | Facility: MEDICAL CENTER | Age: 57
End: 2022-01-12
Attending: UROLOGY
Payer: COMMERCIAL

## 2022-01-17 ENCOUNTER — HOSPITAL ENCOUNTER (OUTPATIENT)
Facility: MEDICAL CENTER | Age: 57
End: 2022-01-17
Attending: UROLOGY
Payer: COMMERCIAL

## 2022-01-17 PROCEDURE — 87086 URINE CULTURE/COLONY COUNT: CPT

## 2022-01-17 PROCEDURE — 81001 URINALYSIS AUTO W/SCOPE: CPT

## 2022-01-17 PROCEDURE — 85025 COMPLETE CBC W/AUTO DIFF WBC: CPT

## 2022-01-17 PROCEDURE — 80048 BASIC METABOLIC PNL TOTAL CA: CPT

## 2022-01-18 ENCOUNTER — APPOINTMENT (OUTPATIENT)
Dept: RADIOLOGY | Facility: MEDICAL CENTER | Age: 57
End: 2022-01-18
Attending: UROLOGY
Payer: COMMERCIAL

## 2022-01-18 ENCOUNTER — ANESTHESIA (OUTPATIENT)
Dept: SURGERY | Facility: MEDICAL CENTER | Age: 57
End: 2022-01-18
Payer: COMMERCIAL

## 2022-01-18 ENCOUNTER — ANESTHESIA EVENT (OUTPATIENT)
Dept: SURGERY | Facility: MEDICAL CENTER | Age: 57
End: 2022-01-18
Payer: COMMERCIAL

## 2022-01-18 ENCOUNTER — PHARMACY VISIT (OUTPATIENT)
Dept: PHARMACY | Facility: MEDICAL CENTER | Age: 57
End: 2022-01-18
Payer: COMMERCIAL

## 2022-01-18 ENCOUNTER — HOSPITAL ENCOUNTER (OUTPATIENT)
Facility: MEDICAL CENTER | Age: 57
End: 2022-01-18
Attending: UROLOGY | Admitting: UROLOGY
Payer: COMMERCIAL

## 2022-01-18 VITALS
TEMPERATURE: 97.4 F | OXYGEN SATURATION: 92 % | SYSTOLIC BLOOD PRESSURE: 109 MMHG | BODY MASS INDEX: 38.48 KG/M2 | RESPIRATION RATE: 16 BRPM | HEIGHT: 66 IN | WEIGHT: 239.42 LBS | DIASTOLIC BLOOD PRESSURE: 61 MMHG | HEART RATE: 72 BPM

## 2022-01-18 DIAGNOSIS — N20.0 KIDNEY STONE ON LEFT SIDE: ICD-10-CM

## 2022-01-18 LAB
ANION GAP SERPL CALC-SCNC: 15 MMOL/L (ref 7–16)
APPEARANCE UR: CLEAR
BACTERIA #/AREA URNS HPF: ABNORMAL /HPF
BASOPHILS # BLD AUTO: 1.8 % (ref 0–1.8)
BASOPHILS # BLD: 0.15 K/UL (ref 0–0.12)
BILIRUB UR QL STRIP.AUTO: NEGATIVE
BUN SERPL-MCNC: 18 MG/DL (ref 8–22)
CALCIUM SERPL-MCNC: 9.8 MG/DL (ref 8.5–10.5)
CHLORIDE SERPL-SCNC: 102 MMOL/L (ref 96–112)
CO2 SERPL-SCNC: 23 MMOL/L (ref 20–33)
COLOR UR: YELLOW
CREAT SERPL-MCNC: 0.61 MG/DL (ref 0.5–1.4)
EOSINOPHIL # BLD AUTO: 0.22 K/UL (ref 0–0.51)
EOSINOPHIL NFR BLD: 2.7 % (ref 0–6.9)
EPI CELLS #/AREA URNS HPF: ABNORMAL /HPF
ERYTHROCYTE [DISTWIDTH] IN BLOOD BY AUTOMATED COUNT: 47.2 FL (ref 35.9–50)
EXTERNAL QUALITY CONTROL: NORMAL
GLUCOSE SERPL-MCNC: 81 MG/DL (ref 65–99)
GLUCOSE UR STRIP.AUTO-MCNC: NEGATIVE MG/DL
HCT VFR BLD AUTO: 47.2 % (ref 37–47)
HGB BLD-MCNC: 14.9 G/DL (ref 12–16)
IMM GRANULOCYTES # BLD AUTO: 0.03 K/UL (ref 0–0.11)
IMM GRANULOCYTES NFR BLD AUTO: 0.4 % (ref 0–0.9)
KETONES UR STRIP.AUTO-MCNC: NEGATIVE MG/DL
LEUKOCYTE ESTERASE UR QL STRIP.AUTO: ABNORMAL
LYMPHOCYTES # BLD AUTO: 3.1 K/UL (ref 1–4.8)
LYMPHOCYTES NFR BLD: 37.5 % (ref 22–41)
MCH RBC QN AUTO: 30.6 PG (ref 27–33)
MCHC RBC AUTO-ENTMCNC: 31.6 G/DL (ref 33.6–35)
MCV RBC AUTO: 96.9 FL (ref 81.4–97.8)
MICRO URNS: ABNORMAL
MONOCYTES # BLD AUTO: 0.58 K/UL (ref 0–0.85)
MONOCYTES NFR BLD AUTO: 7 % (ref 0–13.4)
NEUTROPHILS # BLD AUTO: 4.18 K/UL (ref 2–7.15)
NEUTROPHILS NFR BLD: 50.6 % (ref 44–72)
NITRITE UR QL STRIP.AUTO: NEGATIVE
NRBC # BLD AUTO: 0 K/UL
NRBC BLD-RTO: 0 /100 WBC
PATHOLOGY CONSULT NOTE: NORMAL
PH UR STRIP.AUTO: 7.5 [PH] (ref 5–8)
PLATELET # BLD AUTO: 492 K/UL (ref 164–446)
PMV BLD AUTO: 10.6 FL (ref 9–12.9)
POTASSIUM SERPL-SCNC: 4.8 MMOL/L (ref 3.6–5.5)
PROT UR QL STRIP: 100 MG/DL
RBC # BLD AUTO: 4.87 M/UL (ref 4.2–5.4)
RBC # URNS HPF: ABNORMAL /HPF
RBC UR QL AUTO: ABNORMAL
SARS-COV+SARS-COV-2 AG RESP QL IA.RAPID: NEGATIVE
SODIUM SERPL-SCNC: 140 MMOL/L (ref 135–145)
SP GR UR STRIP.AUTO: 1.02
UROBILINOGEN UR STRIP.AUTO-MCNC: 0.2 MG/DL
WBC # BLD AUTO: 8.3 K/UL (ref 4.8–10.8)
WBC #/AREA URNS HPF: ABNORMAL /HPF

## 2022-01-18 PROCEDURE — 700105 HCHG RX REV CODE 258: Performed by: ANESTHESIOLOGY

## 2022-01-18 PROCEDURE — C1769 GUIDE WIRE: HCPCS | Performed by: UROLOGY

## 2022-01-18 PROCEDURE — 160039 HCHG SURGERY MINUTES - EA ADDL 1 MIN LEVEL 3: Performed by: UROLOGY

## 2022-01-18 PROCEDURE — 160048 HCHG OR STATISTICAL LEVEL 1-5: Performed by: UROLOGY

## 2022-01-18 PROCEDURE — 700102 HCHG RX REV CODE 250 W/ 637 OVERRIDE(OP): Performed by: UROLOGY

## 2022-01-18 PROCEDURE — RXMED WILLOW AMBULATORY MEDICATION CHARGE: Performed by: UROLOGY

## 2022-01-18 PROCEDURE — 160009 HCHG ANES TIME/MIN: Performed by: UROLOGY

## 2022-01-18 PROCEDURE — 700111 HCHG RX REV CODE 636 W/ 250 OVERRIDE (IP): Performed by: ANESTHESIOLOGY

## 2022-01-18 PROCEDURE — 88300 SURGICAL PATH GROSS: CPT

## 2022-01-18 PROCEDURE — A9270 NON-COVERED ITEM OR SERVICE: HCPCS | Performed by: UROLOGY

## 2022-01-18 PROCEDURE — 87426 SARSCOV CORONAVIRUS AG IA: CPT | Performed by: UROLOGY

## 2022-01-18 PROCEDURE — 160035 HCHG PACU - 1ST 60 MINS PHASE I: Performed by: UROLOGY

## 2022-01-18 PROCEDURE — 160002 HCHG RECOVERY MINUTES (STAT): Performed by: UROLOGY

## 2022-01-18 PROCEDURE — 700101 HCHG RX REV CODE 250: Performed by: ANESTHESIOLOGY

## 2022-01-18 PROCEDURE — 160028 HCHG SURGERY MINUTES - 1ST 30 MINS LEVEL 3: Performed by: UROLOGY

## 2022-01-18 PROCEDURE — 160046 HCHG PACU - 1ST 60 MINS PHASE II: Performed by: UROLOGY

## 2022-01-18 PROCEDURE — C2617 STENT, NON-COR, TEM W/O DEL: HCPCS | Performed by: UROLOGY

## 2022-01-18 PROCEDURE — 82365 CALCULUS SPECTROSCOPY: CPT

## 2022-01-18 PROCEDURE — 160025 RECOVERY II MINUTES (STATS): Performed by: UROLOGY

## 2022-01-18 PROCEDURE — 700105 HCHG RX REV CODE 258: Performed by: UROLOGY

## 2022-01-18 DEVICE — STENT UROLOGICAL POLARIS 6X26  ULTRA: Type: IMPLANTABLE DEVICE | Site: URETER | Status: FUNCTIONAL

## 2022-01-18 RX ORDER — CEFAZOLIN SODIUM 1 G/3ML
INJECTION, POWDER, FOR SOLUTION INTRAMUSCULAR; INTRAVENOUS PRN
Status: DISCONTINUED | OUTPATIENT
Start: 2022-01-18 | End: 2022-01-18 | Stop reason: SURG

## 2022-01-18 RX ORDER — LIDOCAINE HYDROCHLORIDE 20 MG/ML
INJECTION, SOLUTION EPIDURAL; INFILTRATION; INTRACAUDAL; PERINEURAL PRN
Status: DISCONTINUED | OUTPATIENT
Start: 2022-01-18 | End: 2022-01-18 | Stop reason: SURG

## 2022-01-18 RX ORDER — IBUPROFEN 200 MG
800 TABLET ORAL 2 TIMES DAILY PRN
COMMUNITY
Start: 2022-01-01

## 2022-01-18 RX ORDER — PHENAZOPYRIDINE HYDROCHLORIDE 200 MG/1
200 TABLET, FILM COATED ORAL 3 TIMES DAILY PRN
Qty: 9 TABLET | Refills: 0 | Status: SHIPPED | OUTPATIENT
Start: 2022-01-18 | End: 2024-03-08

## 2022-01-18 RX ORDER — HALOPERIDOL 5 MG/ML
1 INJECTION INTRAMUSCULAR
Status: DISCONTINUED | OUTPATIENT
Start: 2022-01-18 | End: 2022-01-18 | Stop reason: HOSPADM

## 2022-01-18 RX ORDER — OMEPRAZOLE 20 MG/1
20 CAPSULE, DELAYED RELEASE ORAL DAILY
COMMUNITY

## 2022-01-18 RX ORDER — OXYCODONE HCL 5 MG/5 ML
5 SOLUTION, ORAL ORAL
Status: DISCONTINUED | OUTPATIENT
Start: 2022-01-18 | End: 2022-01-18 | Stop reason: HOSPADM

## 2022-01-18 RX ORDER — ATROPA BELLADONNA AND OPIUM 16.2; 6 MG/1; MG/1
60 SUPPOSITORY RECTAL
Status: CANCELLED | OUTPATIENT
Start: 2022-01-18

## 2022-01-18 RX ORDER — MIDAZOLAM HYDROCHLORIDE 1 MG/ML
INJECTION INTRAMUSCULAR; INTRAVENOUS PRN
Status: DISCONTINUED | OUTPATIENT
Start: 2022-01-18 | End: 2022-01-18 | Stop reason: SURG

## 2022-01-18 RX ORDER — DEXAMETHASONE SODIUM PHOSPHATE 4 MG/ML
INJECTION, SOLUTION INTRA-ARTICULAR; INTRALESIONAL; INTRAMUSCULAR; INTRAVENOUS; SOFT TISSUE PRN
Status: DISCONTINUED | OUTPATIENT
Start: 2022-01-18 | End: 2022-01-18 | Stop reason: SURG

## 2022-01-18 RX ORDER — HYDROCODONE BITARTRATE AND ACETAMINOPHEN 5; 325 MG/1; MG/1
1 TABLET ORAL EVERY 8 HOURS PRN
Qty: 15 TABLET | Refills: 0 | Status: SHIPPED | OUTPATIENT
Start: 2022-01-18 | End: 2022-01-23

## 2022-01-18 RX ORDER — OXYCODONE HCL 5 MG/5 ML
10 SOLUTION, ORAL ORAL
Status: DISCONTINUED | OUTPATIENT
Start: 2022-01-18 | End: 2022-01-18 | Stop reason: HOSPADM

## 2022-01-18 RX ORDER — DIPHENHYDRAMINE HYDROCHLORIDE 50 MG/ML
12.5 INJECTION INTRAMUSCULAR; INTRAVENOUS
Status: DISCONTINUED | OUTPATIENT
Start: 2022-01-18 | End: 2022-01-18 | Stop reason: HOSPADM

## 2022-01-18 RX ORDER — ACETAMINOPHEN 500 MG
1000 TABLET ORAL ONCE
Status: CANCELLED | OUTPATIENT
Start: 2022-01-18 | End: 2022-01-18

## 2022-01-18 RX ORDER — ONDANSETRON 2 MG/ML
4 INJECTION INTRAMUSCULAR; INTRAVENOUS
Status: DISCONTINUED | OUTPATIENT
Start: 2022-01-18 | End: 2022-01-18 | Stop reason: HOSPADM

## 2022-01-18 RX ORDER — HYDROCODONE BITARTRATE AND ACETAMINOPHEN 5; 325 MG/1; MG/1
1 TABLET ORAL 3 TIMES DAILY PRN
COMMUNITY
Start: 2021-12-13

## 2022-01-18 RX ORDER — PHENAZOPYRIDINE HYDROCHLORIDE 100 MG/1
200 TABLET, FILM COATED ORAL
Status: COMPLETED | OUTPATIENT
Start: 2022-01-18 | End: 2022-01-18

## 2022-01-18 RX ORDER — ONDANSETRON 2 MG/ML
INJECTION INTRAMUSCULAR; INTRAVENOUS PRN
Status: DISCONTINUED | OUTPATIENT
Start: 2022-01-18 | End: 2022-01-18 | Stop reason: SURG

## 2022-01-18 RX ORDER — SODIUM CHLORIDE, SODIUM LACTATE, POTASSIUM CHLORIDE, CALCIUM CHLORIDE 600; 310; 30; 20 MG/100ML; MG/100ML; MG/100ML; MG/100ML
INJECTION, SOLUTION INTRAVENOUS CONTINUOUS
Status: DISCONTINUED | OUTPATIENT
Start: 2022-01-18 | End: 2022-01-18 | Stop reason: HOSPADM

## 2022-01-18 RX ORDER — ROCURONIUM BROMIDE 10 MG/ML
INJECTION, SOLUTION INTRAVENOUS PRN
Status: DISCONTINUED | OUTPATIENT
Start: 2022-01-18 | End: 2022-01-18 | Stop reason: HOSPADM

## 2022-01-18 RX ORDER — TAMSULOSIN HYDROCHLORIDE 0.4 MG/1
0.4 CAPSULE ORAL DAILY
Qty: 21 CAPSULE | Refills: 1 | Status: SHIPPED | OUTPATIENT
Start: 2022-01-18 | End: 2024-03-08

## 2022-01-18 RX ADMIN — ROCURONIUM BROMIDE 40 MG: 10 INJECTION, SOLUTION INTRAVENOUS at 07:33

## 2022-01-18 RX ADMIN — MIDAZOLAM HYDROCHLORIDE 2 MG: 1 INJECTION, SOLUTION INTRAMUSCULAR; INTRAVENOUS at 07:33

## 2022-01-18 RX ADMIN — SODIUM CHLORIDE, POTASSIUM CHLORIDE, SODIUM LACTATE AND CALCIUM CHLORIDE: 600; 310; 30; 20 INJECTION, SOLUTION INTRAVENOUS at 06:50

## 2022-01-18 RX ADMIN — CEFAZOLIN 2 G: 330 INJECTION, POWDER, FOR SOLUTION INTRAMUSCULAR; INTRAVENOUS at 07:26

## 2022-01-18 RX ADMIN — LIDOCAINE HYDROCHLORIDE 100 MG: 20 INJECTION, SOLUTION EPIDURAL; INFILTRATION; INTRACAUDAL at 07:33

## 2022-01-18 RX ADMIN — PROPOFOL 150 MG: 10 INJECTION, EMULSION INTRAVENOUS at 07:33

## 2022-01-18 RX ADMIN — DEXAMETHASONE SODIUM PHOSPHATE 4 MG: 4 INJECTION, SOLUTION INTRA-ARTICULAR; INTRALESIONAL; INTRAMUSCULAR; INTRAVENOUS; SOFT TISSUE at 07:33

## 2022-01-18 RX ADMIN — PHENAZOPYRIDINE HYDROCHLORIDE 200 MG: 100 TABLET ORAL at 08:39

## 2022-01-18 RX ADMIN — FENTANYL CITRATE 25 MCG: 50 INJECTION INTRAMUSCULAR; INTRAVENOUS at 08:34

## 2022-01-18 RX ADMIN — HALOPERIDOL LACTATE 1 MG: 5 INJECTION, SOLUTION INTRAMUSCULAR at 08:29

## 2022-01-18 RX ADMIN — FENTANYL CITRATE 100 MCG: 50 INJECTION, SOLUTION INTRAMUSCULAR; INTRAVENOUS at 07:33

## 2022-01-18 RX ADMIN — SUGAMMADEX 200 MG: 100 INJECTION, SOLUTION INTRAVENOUS at 08:08

## 2022-01-18 RX ADMIN — FENTANYL CITRATE 25 MCG: 50 INJECTION INTRAMUSCULAR; INTRAVENOUS at 08:30

## 2022-01-18 RX ADMIN — SODIUM CHLORIDE, POTASSIUM CHLORIDE, SODIUM LACTATE AND CALCIUM CHLORIDE: 600; 310; 30; 20 INJECTION, SOLUTION INTRAVENOUS at 08:30

## 2022-01-18 RX ADMIN — ONDANSETRON 4 MG: 2 INJECTION INTRAMUSCULAR; INTRAVENOUS at 07:33

## 2022-01-18 ASSESSMENT — PAIN DESCRIPTION - PAIN TYPE
TYPE: CHRONIC PAIN
TYPE: OTHER (COMMENT)

## 2022-01-18 ASSESSMENT — FIBROSIS 4 INDEX: FIB4 SCORE: 0.44

## 2022-01-18 ASSESSMENT — PAIN SCALES - GENERAL: PAIN_LEVEL: 2

## 2022-01-18 NOTE — PROGRESS NOTES
Medications delivered by meds to beds. Discharge instructions and medications reviewed with patient and family. All questions answered at this time, discharge instructions signed.

## 2022-01-18 NOTE — OR NURSING
Family updated by text  The pt is awake and oriented. Respirations are regular and easy. Pain is controlled, the pt is comfortable.

## 2022-01-18 NOTE — OR NURSING
The pt was given an incentive spirometer. Instructed on its use and benefits. Pt can raise the blue chamber to 2000 ml

## 2022-01-18 NOTE — OR NURSING
Assumed care of patient in pre-op.   Consents signed for surgery.   PIV established.   COVID test= Negative.    Call light in reach.

## 2022-01-18 NOTE — OP REPORT
Urologic Surgery Operative Report     Pre-op Diagnosis: Left ureterolithiasis   Post-op Diagnosis: Same as above   Procedure: L ureteroscopy, laser lithotripsy, stone basketing stent exchange   Surgeon: Surgeon(s) and Role:     * Jefe Harry M.D. - Primary   Assistant: Circulator: Alex Wong R.N.  Laser Staff: Andrade Patterson  Scrub Person: Teri Hurley; Waldomelanie Hector   Anesthesia: General,   Anesthesiologist: Baljeet Solomon M.D.   Estimated Blood Loss: * No blood loss amount entered *   IV fluids <1L Crystalloid    Specimens: 1. Stone for chemical analysis    Complications: None   Condition: Stable, procedure well tolerated    Drains: 1. [id number], JJ stent((on strings) )  2. No blackwell   Disposition:  1. PACU, discharge after voiding  2. f/u 1wk for stent removal and in 6-8wks with u/s prior   Findings 1. 0.6 cm stone at Left Lower pole        Indication:   Previously stented for obstructing infected stone, here for definitive tx.  After a full discussion of alternatives, risks, and benefits the patient consented to proceeding with Ureteroscopy, laser lithotripsy and possible JJ stent placement on the respective side.  She understands the risk of inability to access ureter, the need for second procedures, the possibility of negative ureteroscopy, that she may have stent discomfort until this is removed, bleeding, infection, ureteral injury or stricture, bladder injury, post op urinary retention requiring blackwell catheter, and the general pulmonary and cardiovascular risks associated with anesthesia.     Procedure Details:   The patient was taken to operating room and placed on table in supine position.  Ancef was administered prior to the start of the procedure based on previous urine cultures. Sequential compression devices were placed for deep venous thrombosis prophylaxis. After induction of general anesthesia, both legs were placed in Juaquin stirrups in the standard lithotomy position.  A timeout  was held confirming the correct patient, procedure and laterality.   The perineal area was prepped and draped in a sterile fashion. A rigid cystoscope was inserted into the urethra and the bladder was emptied and then distended with sterile saline. Urethral sounds were not needed to dilate the urethral meatus. Cystoscopy revealed normal bladder mucosa, orthotopic ureteral orifices, and no other abnormalities.    We passed a wire next to the previously placed JJ stent on the respective side. We then grasped the stent and removed it under flouroscopic vision.And placed a second wire along side the first.    We inserted the flexible ureteroscope over the second wire and identified the stone in the lower pole  . The holmium laser was then used to fracture the stone until it was in less than 1mm fragments. Final 3-4mm fragment was grasped and removed with the ureteroscope under direct visualization without difficulty.    Returnign to rigid cystoscope, we then placed a L-sided JJ stent, [id number], JJ stent (on strings)  under fluoroscopy. We then saw that the JJ stent was in appropriate position, with a good curl visualized in the renal pelvis fluoroscopically and a good curl in the bladder endoscopically. Stones sent for analysis. The cystoscope was removed after emptying the bladder.  The patient was awoken from anesthesia and brought to recovery in satisfactory condition.        Jefe Harry M.D.   [id number] [place] [id number]   [phone]

## 2022-01-18 NOTE — ANESTHESIA PREPROCEDURE EVALUATION
Case: [id number] Date/Time: 01/18/22 0715    Procedures:       CYSTOSCOPY, WITH URETERAL STENT INSERTION (Left )      URETEROSCOPY      LITHOTRIPSY, USING LASER    Pre-op diagnosis: KIDNEY STONE    Location: [facility] OR 18 / SURGERY [facility]    Surgeons: Jefe Harry M.D.          Relevant Problems      (positive) Kidney stone on left side       Physical Exam    Airway   Mallampati: II  TM distance: >3 FB  Neck ROM: full       Cardiovascular - normal exam  Rhythm: regular  Rate: normal  (-) murmur     Dental - normal exam           Pulmonary - normal exam  Breath sounds clear to auscultation     Abdominal    Neurological - normal exam                 Anesthesia Plan    ASA 2       Plan - general       Airway plan will be ETT          Induction: intravenous    Postoperative Plan: Postoperative administration of opioids is intended.    Pertinent diagnostic labs and testing reviewed    Informed Consent:    Anesthetic plan and risks discussed with patient.    Use of blood products discussed with: patient whom consented to blood products.

## 2022-01-18 NOTE — DISCHARGE INSTRUCTIONS
ACTIVITY: Rest and take it easy for the first 24 hours.  A responsible adult is recommended to remain with you during that time.  It is normal to feel sleepy.  We encourage you to not do anything that requires balance, judgment or coordination.    MILD FLU-LIKE SYMPTOMS ARE NORMAL. YOU MAY EXPERIENCE GENERALIZED MUSCLE ACHES, THROAT IRRITATION, HEADACHE AND/OR SOME NAUSEA.    FOR 24 HOURS DO NOT:  Drive, operate machinery or run household appliances.  Drink beer or alcoholic beverages.   Make important decisions or sign legal documents.    SPECIAL INSTRUCTIONS:   Cystoscopy  Cystoscopy is a procedure that is used to help diagnose and sometimes treat conditions that affect the lower urinary tract. The lower urinary tract includes the bladder and the urethra. The urethra is the tube that drains urine from the bladder. Cystoscopy is done using a thin, tube-shaped instrument with a light and camera at the end (cystoscope). The cystoscope may be hard or flexible, depending on the goal of the procedure. The cystoscope is inserted through the urethra, into the bladder.  What can I expect after the procedure?  After the procedure, it is common to have:  · Some soreness or pain in your abdomen and urethra.  · Urinary symptoms. These include:  ? Mild pain or burning when you urinate. Pain should stop within a few minutes after you urinate. This may last for up to 1 week.  ? A small amount of blood in your urine for several days.  ? Feeling like you need to urinate but producing only a small amount of urine.  Follow these instructions at home:  Medicines  · Take over-the-counter and prescription medicines only as told by your health care provider.  · If you were prescribed an antibiotic medicine, take it as told by your health care provider. Do not stop taking the antibiotic even if you start to feel better.  General instructions  · Return to your normal activities as told by your health care provider. Ask your health care  provider what activities are safe for you.  · Do not drive for 24 hours if you were given a sedative during your procedure.  · Watch for any blood in your urine. If the amount of blood in your urine increases, call your health care provider.  · Follow instructions from your health care provider about eating or drinking restrictions.  · If a tissue sample was removed for testing (biopsy) during your procedure, it is up to you to get your test results. Ask your health care provider, or the department that is doing the test, when your results will be ready.  · Drink enough fluid to keep your urine pale yellow.  · Keep all follow-up visits as told by your health care provider. This is important.  Contact a health care provider if you:  · Have pain that gets worse or does not get better with medicine, especially pain when you urinate.  · Have trouble urinating.  · Have more blood in your urine.  Get help right away if you:  · Have blood clots in your urine.  · Have abdominal pain.  · Have a fever or chills.  · Are unable to urinate.  Summary  · Cystoscopy is a procedure that is used to help diagnose and sometimes treat conditions that affect the lower urinary tract.  · Cystoscopy is done using a thin, tube-shaped instrument with a light and camera at the end.  · After the procedure, it is common to have some soreness or pain in your abdomen and urethra.  · Watch for any blood in your urine. If the amount of blood in your urine increases, call your health care provider.  · If you were prescribed an antibiotic medicine, take it as told by your health care provider. Do not stop taking the antibiotic even if you start to feel better.  This information is not intended to replace advice given to you by your health care provider. Make sure you discuss any questions you have with your health care provider.  Document Released: 12/15/2001 Document Revised: 12/10/2019 Document Reviewed: 12/10/2019  Elsevier Patient Education © 2020  [employer]      Lithotripsy, Care After  This sheet gives you information about how to care for yourself after your procedure. Your health care provider may also give you more specific instructions. If you have problems or questions, contact your health care provider.  What can I expect after the procedure?  After the procedure, it is common to have:  · Some blood in your urine. This should only last for a few days.  · Soreness in your back, sides, or upper abdomen for a few days.  · Blotches or bruises on your back where the pressure wave entered the skin.  · Pain, discomfort, or nausea when pieces (fragments) of the kidney stone move through the tube that carries urine from the kidney to the bladder (ureter). Stone fragments may pass soon after the procedure, but they may continue to pass for up to 4-8 weeks.  ? If you have severe pain or nausea, contact your health care provider. This may be caused by a large stone that was not broken up, and this may mean that you need more treatment.  · Some pain or discomfort during urination.  · Some pain or discomfort in the lower abdomen or (in men) at the base of the penis.  Follow these instructions at home:  Medicines  · Take over-the-counter and prescription medicines only as told by your health care provider.  · If you were prescribed an antibiotic medicine, take it as told by your health care provider. Do not stop taking the antibiotic even if you start to feel better.  · Do not drive for 24 hours if you were given a medicine to help you relax (sedative).  · Do not drive or use heavy machinery while taking prescription pain medicine.  Eating and drinking         · Drink enough water and fluids to keep your urine clear or pale yellow. This helps any remaining pieces of the stone to pass. It can also help prevent new stones from forming.  · Eat plenty of fresh fruits and vegetables.  · Follow instructions from your health care provider about eating and drinking  restrictions. You may be instructed:  ? To reduce how much salt (sodium) you eat or drink. Check ingredients and nutrition facts on packaged foods and beverages.  ? To reduce how much meat you eat.  · Eat the recommended amount of calcium for your age and gender. Ask your health care provider how much calcium you should have.  General instructions  · Get plenty of rest.  · Most people can resume normal activities 1-2 days after the procedure. Ask your health care provider what activities are safe for you.  · Your health care provider may direct you to lie in a certain position (postural drainage) and tap firmly (percuss) over your kidney area to help stone fragments pass. Follow instructions as told by your health care provider.  · If directed, strain all urine through the strainer that was provided by your health care provider.  ? Keep all fragments for your health care provider to see. Any stones that are found may be sent to a medical lab for examination. The stone may be as small as a grain of salt.  · Keep all follow-up visits as told by your health care provider. This is important.  Contact a health care provider if:  · You have pain that is severe or does not get better with medicine.  · You have nausea that is severe or does not go away.  · You have blood in your urine longer than your health care provider told you to expect.  · You have more blood in your urine.  · You have pain during urination that does not go away.  · You urinate more frequently than usual and this does not go away.  · You develop a rash or any other possible signs of an allergic reaction.  Get help right away if:  · You have severe pain in your back, sides, or upper abdomen.  · You have severe pain while urinating.  · Your urine is very dark red.  · You have blood in your stool (feces).  · You cannot pass any urine at all.  · You feel a strong urge to urinate after emptying your bladder.  · You have a fever or chills.  · You develop  shortness of breath, difficulty breathing, or chest pain.  · You have severe nausea that leads to persistent vomiting.  · You faint.  Summary  · After this procedure, it is common to have some pain, discomfort, or nausea when pieces (fragments) of the kidney stone move through the tube that carries urine from the kidney to the bladder (ureter). If this pain or nausea is severe, however, you should contact your health care provider.  · Most people can resume normal activities 1-2 days after the procedure. Ask your health care provider what activities are safe for you.  · Drink enough water and fluids to keep your urine clear or pale yellow. This helps any remaining pieces of the stone to pass, and it can help prevent new stones from forming.  · If directed, strain your urine and keep all fragments for your health care provider to see. Fragments or stones may be as small as a grain of salt.  · Get help right away if you have severe pain in your back, sides, or upper abdomen or have severe pain while urinating.  This information is not intended to replace advice given to you by your health care provider. Make sure you discuss any questions you have with your health care provider.  Document Released: 01/06/2009 Document Revised: 03/30/2020 Document Reviewed: 11/08/2017  [employer] Patient Education © 2020 [employer]        DIET: To avoid nausea, slowly advance diet as tolerated, avoiding spicy or greasy foods for the first day.  Add more substantial food to your diet according to your physician's instructions.  Babies can be fed formula or breast milk as soon as they are hungry.  INCREASE FLUIDS AND FIBER TO AVOID CONSTIPATION.    FOLLOW-UP APPOINTMENT:  A follow-up appointment should be arranged with your doctor; call to schedule  Dr. Harry [phone].    You should CALL YOUR PHYSICIAN if you develop:  Fever greater than 101 degrees F.  Pain not relieved by medication, or persistent nausea or vomiting.  Excessive  bleeding (blood soaking through dressing) or unexpected drainage from the wound.  Extreme redness or swelling around the incision site, drainage of pus or foul smelling drainage.  Inability to urinate or empty your bladder within 8 hours.  Problems with breathing or chest pain.    You should call 911 if you develop problems with breathing or chest pain.  If you are unable to contact your doctor or surgical center, you should go to the nearest emergency room or urgent care center.  Physician's telephone #: *Dr. Harry [phone]**    If any questions arise, call your doctor.  If your doctor is not available, please feel free to call the Surgical Center at [phone].     A registered nurse may call you a few days after your surgery to see how you are doing after your procedure.    MEDICATIONS: Resume taking daily medication.  Take prescribed pain medication with food.  If no medication is prescribed, you may take non-aspirin pain medication if needed.  PAIN MEDICATION CAN BE VERY CONSTIPATING.  Take a stool softener or laxative such as senokot, pericolace, or milk of magnesia if needed.    Prescriptions *Pyridium, Flomax, Henry sent to [facility] at [place]**.  Last pain medication given Pyridium at 8:39    If your physician has prescribed pain medication that includes Acetaminophen (Tylenol), do not take additional Acetaminophen (Tylenol) while taking the prescribed medication.    Depression / Suicide Risk    As you are discharged from this [facility], it is important to learn how to keep safe from harming yourself.    Recognize the warning signs:  · Abrupt changes in personality, positive or negative- including increase in energy   · Giving away possessions  · Change in eating patterns- significant weight changes-  positive or negative  · Change in sleeping patterns- unable to sleep or sleeping all the time   · Unwillingness or inability to communicate  · Depression  · Unusual sadness,  discouragement and loneliness  · Talk of wanting to die  · Neglect of personal appearance   · Rebelliousness- reckless behavior  · Withdrawal from people/activities they love  · Confusion- inability to concentrate     If you or a loved one observes any of these behaviors or has concerns about self-harm, here's what you can do:  · Talk about it- your feelings and reasons for harming yourself  · Remove any means that you might use to hurt yourself (examples: pills, rope, extension cords, firearm)  · Get professional help from the community (Mental Health, Substance Abuse, psychological counseling)  · Do not be alone:Call your Safe Contact- someone whom you trust who will be there for you.  · Call your local CRISIS HOTLINE 099-8959 or 349-690-5555  · Call your local Children's Mobile Crisis Response Team [place] (211) 443-6855 or www.MtoV  · Call the toll free National Suicide Prevention Hotlines   · National Suicide Prevention Lifeline 787-054-CLJC (3316)  · National Hope Line Network 800-SUICIDE (072-1829)

## 2022-01-18 NOTE — INTERVAL H&P NOTE
Patient seen and evaluated. No new complaints. Exam unchanged. Will proceed with planned procedure as scheduled.

## 2022-01-18 NOTE — ANESTHESIA PROCEDURE NOTES
Airway    Date/Time: 1/18/2022 7:33 AM  Performed by: Baljeet Solomon M.D.  Authorized by: Baljeet Solomon M.D.     Location:  OR  Urgency:  Elective  Indications for Airway Management:  Anesthesia      Spontaneous Ventilation: absent    Sedation Level:  Deep  Preoxygenated: Yes    Patient Position:  Sniffing  Final Airway Type:  Endotracheal airway  Final Endotracheal Airway:  ETT  Cuffed: Yes    Technique Used for Successful ETT Placement:  Direct laryngoscopy    Insertion Site:  Oral  Blade Type:  Lazaro  Laryngoscope Blade/Videolaryngoscope Blade Size:  2  ETT Size (mm):  7.0  Measured from:  Teeth  ETT to Teeth (cm):  22  Placement Verified by: auscultation and capnometry    Cormack-Lehane Classification:  Grade I - full view of glottis  Number of Attempts at Approach:  1

## 2022-01-18 NOTE — OR NURSING
0924 - rec pt from PACU, A&O, minimal assist to move from gurney to recliner.  Denies pain and urge to void.  Oriented to room, call light within reach, belongings at bedside.  Taking po well.   at bedside.

## 2022-01-18 NOTE — DISCHARGE PLANNING
Meds-to-Beds: Discharge prescription orders listed below delivered to patient's bedside. RN Chema notified. Patient counseled. Patient elected to have co-payment billed to patient account.       Current Outpatient Medications   Medication Sig Dispense Refill   • phenazopyridine (PYRIDIUM) 200 MG Tab Take 1 Tablet by mouth 3 times a day as needed (dysuria). 9 Tablet 0   • tamsulosin (FLOMAX) 0.4 MG capsule Take 1 Capsule by mouth every day. 21 Capsule 1   • HYDROcodone-acetaminophen (NORCO) 5-325 MG Tab per tablet Take 1 Tablet by mouth every 8 hours as needed for up to 5 days. 15 Tablet 0      Tara Saavedra, PharmD

## 2022-01-18 NOTE — ANESTHESIA POSTPROCEDURE EVALUATION
Patient: Nunu Reyes    Procedure Summary     Date: 01/18/22 Room / Location: [place] [address] / SURGERY [facility]    Anesthesia Start: 0726 Anesthesia Stop: 0819    Procedures:       CYSTOSCOPY, WITH URETERAL STENT REMOVAL AND INSERTION (Left Bladder)      URETEROSCOPY (Left Ureter)      LITHOTRIPSY, USING LASER (Left Ureter) Diagnosis: (LEFT KIDNEY STONE)    Surgeons: Jefe Harry M.D. Responsible Provider: Baljeet Solomon M.D.    Anesthesia Type: general ASA Status: 2          Final Anesthesia Type: general  Last vitals  BP   Blood Pressure: 136/79    Temp   36.5 °C (97.7 °F)    Pulse   75   Resp   18    SpO2   92 %      Anesthesia Post Evaluation    Patient location during evaluation: PACU  Patient participation: complete - patient participated  Level of consciousness: awake and alert  Pain score: 2    Airway patency: patent  Anesthetic complications: no  Cardiovascular status: hemodynamically stable  Respiratory status: acceptable  Hydration status: euvolemic    PONV: none          There were no known complications for this encounter.     Nurse Pain Score: 6 (NPRS)

## 2022-01-18 NOTE — ANESTHESIA TIME REPORT
Anesthesia Start and Stop Event Times     Date Time Event    1/18/2022 0726 Anesthesia Start     0731 Ready for Procedure     0819 Anesthesia Stop        Responsible Staff  01/18/22    Name Role Begin End    Baljeet Solomon M.D. Anesth 0726 0819        Preop Diagnosis (Free Text):  Pre-op Diagnosis     KIDNEY STONE        Preop Diagnosis (Codes):    Premium Reason  Non-Premium    Comments:

## 2022-01-20 LAB
BACTERIA UR CULT: NORMAL
SIGNIFICANT IND 70042: NORMAL
SITE SITE: NORMAL
SOURCE SOURCE: NORMAL

## 2022-01-21 LAB
APPEARANCE STONE: NORMAL
COMPN STONE: NORMAL
SPECIMEN WT: 19 MG

## 2024-03-08 ENCOUNTER — OFFICE VISIT (OUTPATIENT)
Dept: CARDIOLOGY | Facility: MEDICAL CENTER | Age: 59
End: 2024-03-08
Attending: INTERNAL MEDICINE
Payer: COMMERCIAL

## 2024-03-08 VITALS
OXYGEN SATURATION: 94 % | BODY MASS INDEX: 37.77 KG/M2 | SYSTOLIC BLOOD PRESSURE: 142 MMHG | DIASTOLIC BLOOD PRESSURE: 82 MMHG | RESPIRATION RATE: 16 BRPM | WEIGHT: 235 LBS | HEIGHT: 66 IN | HEART RATE: 73 BPM

## 2024-03-08 DIAGNOSIS — R03.0 ELEVATED BLOOD PRESSURE READING: ICD-10-CM

## 2024-03-08 DIAGNOSIS — Z76.89 ENCOUNTER TO ESTABLISH CARE: ICD-10-CM

## 2024-03-08 DIAGNOSIS — R94.31 ABNORMAL ELECTROCARDIOGRAM (ECG) (EKG): ICD-10-CM

## 2024-03-08 PROCEDURE — 93005 ELECTROCARDIOGRAM TRACING: CPT | Performed by: INTERNAL MEDICINE

## 2024-03-08 PROCEDURE — 3079F DIAST BP 80-89 MM HG: CPT | Performed by: INTERNAL MEDICINE

## 2024-03-08 PROCEDURE — 3077F SYST BP >= 140 MM HG: CPT | Performed by: INTERNAL MEDICINE

## 2024-03-08 PROCEDURE — 99202 OFFICE O/P NEW SF 15 MIN: CPT | Performed by: INTERNAL MEDICINE

## 2024-03-08 PROCEDURE — 99213 OFFICE O/P EST LOW 20 MIN: CPT | Performed by: INTERNAL MEDICINE

## 2024-03-08 PROCEDURE — 99204 OFFICE O/P NEW MOD 45 MIN: CPT | Performed by: INTERNAL MEDICINE

## 2024-03-08 RX ORDER — ALBUTEROL SULFATE 90 UG/1
2 AEROSOL, METERED RESPIRATORY (INHALATION) EVERY 4 HOURS PRN
COMMUNITY
Start: 2024-01-08

## 2024-03-08 RX ORDER — ALBUTEROL SULFATE 2.5 MG/3ML
2.5 SOLUTION RESPIRATORY (INHALATION) EVERY 4 HOURS PRN
COMMUNITY
Start: 2024-02-06

## 2024-03-08 ASSESSMENT — ENCOUNTER SYMPTOMS
SYNCOPE: 0
PALPITATIONS: 0
DYSPNEA ON EXERTION: 0
IRREGULAR HEARTBEAT: 0
HEARTBURN: 0
BACK PAIN: 0
WEIGHT LOSS: 0
VOMITING: 0
ABDOMINAL PAIN: 0
DECREASED APPETITE: 0
ALTERED MENTAL STATUS: 0
DIARRHEA: 0
WEIGHT GAIN: 0
NEAR-SYNCOPE: 0
BLURRED VISION: 0
NAUSEA: 0
DIZZINESS: 0
CONSTIPATION: 0
PND: 0
SHORTNESS OF BREATH: 0
DEPRESSION: 0
FLANK PAIN: 0
ORTHOPNEA: 0
CLAUDICATION: 0
COUGH: 0
FEVER: 0

## 2024-03-08 NOTE — PATIENT INSTRUCTIONS
Please track home vitals. Send one week of results when available.     Please check stress test.

## 2024-03-08 NOTE — PROGRESS NOTES
"Cardiology Note    Chief Complaint   Patient presents with    Establish Care    Other     F/V Dx: Oxygen decrease         History of Present Illness: Nunu Reyes is a 58 y.o. female who presents for initial visit. Referred for hypoxia by Dr Allen.     Describes longstanding hypoxia. \"Had time keeping oxygen up.\" She was admitted December. She had diverticulitis she states. Following this suffered pneumonia. Works at [employer]. Lives in [place]. Has shortness of breath. She does remember overnight pulse ox positive for overnight hypoxia as well.  describes moments of apnea. She does walk at work and suffers dyspnea. Denies angina. No other cardiac complaints. Used to smoke 30-40 pack years. No relevant family history.     Review of Systems   Constitutional: Negative for decreased appetite, fever, malaise/fatigue, weight gain and weight loss.   HENT:  Negative for congestion and nosebleeds.    Eyes:  Negative for blurred vision.   Cardiovascular:  Negative for chest pain, claudication, dyspnea on exertion, irregular heartbeat, leg swelling, near-syncope, orthopnea, palpitations, paroxysmal nocturnal dyspnea and syncope.   Respiratory:  Negative for cough and shortness of breath.    Endocrine: Negative for cold intolerance and heat intolerance.   Skin:  Negative for rash.   Musculoskeletal:  Negative for back pain.   Gastrointestinal:  Negative for abdominal pain, constipation, diarrhea, heartburn, melena, nausea and vomiting.   Genitourinary:  Negative for dysuria, flank pain and hematuria.   Neurological:  Negative for dizziness.   Psychiatric/Behavioral:  Negative for altered mental status and depression.          Past Medical History:   Diagnosis Date    Breath shortness 01/12/2022    High cholesterol     Psychiatric problem 01/12/2022    aniety    Snoring          Past Surgical History:   Procedure Laterality Date    RI CYSTOSCOPY,INSERT URETERAL STENT Left 1/18/2022    Procedure: CYSTOSCOPY, WITH " URETERAL STENT REMOVAL AND INSERTION;  Surgeon: Jefe Harry M.D.;  Location: SURGERY [facility];  Service: Urology    IL CYSTO/URETERO/PYELOSCOPY, DX Left 1/18/2022    Procedure: URETEROSCOPY;  Surgeon: Jefe Harry M.D.;  Location: SURGERY [facility];  Service: Urology    LASERTRIPSY Left 1/18/2022    Procedure: LITHOTRIPSY, USING LASER;  Surgeon: Jefe Harry M.D.;  Location: SURGERY [facility];  Service: Urology    IL CYSTOSCOPY,INSERT URETERAL STENT Left 1/2/2022    Procedure: CYSTOSCOPY, WITH URETERAL STENT INSERTION;  Surgeon: Jefe Harry M.D.;  Location: SURGERY [facility];  Service: Urology    HYSTERECTOMY, TOTAL ABDOMINAL      PRIMARY C SECTION           Current Outpatient Medications   Medication Sig Dispense Refill    albuterol (PROVENTIL) 2.5mg/3ml Nebu Soln solution for nebulization Take 2.5 mg by nebulization every four hours as needed for Shortness of Breath.      albuterol 108 (90 Base) MCG/ACT Aero Soln inhalation aerosol Inhale 2 Puffs every four hours as needed for Shortness of Breath.      HYDROcodone-acetaminophen (NORCO) 5-325 MG Tab per tablet Take 1 Tablet by mouth 3 times a day as needed.      ibuprofen (MOTRIN) 200 MG Tab Take 800 mg by mouth 2 times a day as needed.      omeprazole (PRILOSEC) 20 MG delayed-release capsule Take 20 mg by mouth every day.      DULoxetine (CYMBALTA) 60 MG Cap DR Particles delayed-release capsule Take 60 mg by mouth every evening.      estradiol (ESTRACE) 2 MG Tab Take 2 mg by mouth every evening.      fenofibrate (TRICOR) 145 MG Tab Take 145 mg by mouth every evening.      traZODone (DESYREL) 100 MG Tab Take 100 mg by mouth every evening.       No current facility-administered medications for this visit.         Allergies   Allergen Reactions    Macrobid [Nitrofurantoin] Anaphylaxis         History reviewed. No pertinent family history.      Social History     Socioeconomic History    Marital status:      Spouse name: Not  "on file    Number of children: Not on file    Years of education: Not on file    Highest education level: Not on file   Occupational History    Not on file   Tobacco Use    Smoking status: Former     Current packs/day: 0.00     Average packs/day: 2.0 packs/day for 19.0 years (38.0 ttl pk-yrs)     Types: Cigarettes     Start date: 1980     Quit date: 1999     Years since quittin.1    Smokeless tobacco: Never   Vaping Use    Vaping Use: Never used   Substance and Sexual Activity    Alcohol use: Never    Drug use: Never    Sexual activity: Not on file   Other Topics Concern    Not on file   Social History Narrative    Not on file     Social Determinants of Health     Financial Resource Strain: Not on file   Food Insecurity: Not on file   Transportation Needs: Not on file   Physical Activity: Not on file   Stress: Not on file   Social Connections: Not on file   Intimate Partner Violence: Not on file   Housing Stability: Not on file         Physical Exam:  Ambulatory Vitals  BP (!) 142/82 (BP Location: Left arm, Patient Position: Sitting, BP Cuff Size: Adult)   Pulse 73   Resp 16   Ht 1.676 m (5' 6\")   Wt 107 kg (235 lb)   SpO2 94%    BP Readings from Last 4 Encounters:   24 (!) 142/82   22 109/61   22 138/74     Weight/BMI:   Vitals:    24 1341   BP: (!) 142/82   Weight: 107 kg (235 lb)   Height: 1.676 m (5' 6\")    Body mass index is 37.93 kg/m².  Wt Readings from Last 4 Encounters:   24 107 kg (235 lb)   22 109 kg (239 lb 6.7 oz)   22 115 kg (253 lb 12 oz)       Physical Exam  Constitutional:       General: She is not in acute distress.  HENT:      Head: Normocephalic and atraumatic.   Eyes:      Conjunctiva/sclera: Conjunctivae normal.      Pupils: Pupils are equal, round, and reactive to light.   Neck:      Vascular: No JVD.   Cardiovascular:      Rate and Rhythm: Normal rate and regular rhythm.      Heart sounds: Normal heart sounds. No murmur heard.     No " "friction rub. No gallop.   Pulmonary:      Effort: Pulmonary effort is normal. No respiratory distress.      Breath sounds: Normal breath sounds. No wheezing or rales.   Chest:      Chest wall: No tenderness.   Abdominal:      General: Bowel sounds are normal. There is no distension.      Palpations: Abdomen is soft.   Musculoskeletal:      Cervical back: Normal range of motion and neck supple.   Skin:     General: Skin is warm and dry.   Neurological:      Mental Status: She is alert and oriented to person, place, and time.   Psychiatric:         Mood and Affect: Affect normal.         Judgment: Judgment normal.         Lab Data Review:  No results found for: \"CHOLSTRLTOT\", \"LDL\", \"HDL\", \"TRIGLYCERIDE\"    Lab Results   Component Value Date/Time    SODIUM 140 01/17/2022 01:30 PM    POTASSIUM 4.8 01/17/2022 01:30 PM    CHLORIDE 102 01/17/2022 01:30 PM    CO2 23 01/17/2022 01:30 PM    GLUCOSE 81 01/17/2022 01:30 PM    BUN 18 01/17/2022 01:30 PM    CREATININE 0.61 01/17/2022 01:30 PM     CrCl cannot be calculated (Patient's most recent lab result is older than the maximum 7 days allowed.).  Lab Results   Component Value Date/Time    ALKPHOSPHAT 42 01/03/2022 12:19 AM    ASTSGOT 22 01/03/2022 12:19 AM    ALTSGPT 33 01/03/2022 12:19 AM    TBILIRUBIN 0.4 01/03/2022 12:19 AM      Lab Results   Component Value Date/Time    WBC 8.3 01/17/2022 01:30 PM     No results found for: \"HBA1C\"  No components found for: \"TROP\"      Cardiac Imaging and Procedures Review:      EKG 3/8/24 interpreted by me sinus 73 bpm, NIVCD    Medical Decision Making:  Problem List Items Addressed This Visit       Encounter to establish care    Relevant Orders    EKG (Completed)    Elevated blood pressure reading    Abnormal electrocardiogram (ECG) (EKG)    Relevant Orders    [id number] PET W/CT ATTENUATION       Check [facility] pet pharm stress. If stable follow up with primary for pulmonary and sleep testing.     Track home vitals and send a week of " results.     It was my pleasure to meet with Ms. Reyes.

## 2024-03-09 LAB — EKG IMPRESSION: NORMAL

## 2024-03-09 PROCEDURE — 93010 ELECTROCARDIOGRAM REPORT: CPT | Performed by: INTERNAL MEDICINE

## 2024-04-26 ENCOUNTER — APPOINTMENT (OUTPATIENT)
Dept: RADIOLOGY | Facility: MEDICAL CENTER | Age: 59
End: 2024-04-26
Attending: INTERNAL MEDICINE
Payer: COMMERCIAL

## 2024-05-17 ENCOUNTER — APPOINTMENT (OUTPATIENT)
Dept: RADIOLOGY | Facility: MEDICAL CENTER | Age: 59
End: 2024-05-17
Attending: INTERNAL MEDICINE
Payer: COMMERCIAL

## (undated) DEVICE — MASK ANESTHESIA ADULT  - (100/CA)

## (undated) DEVICE — WATER IRRIG. STER 3000 ML - (4/CA)

## (undated) DEVICE — ELECTRODE 850 FOAM ADHESIVE - HYDROGEL RADIOTRNSPRNT (50/PK)

## (undated) DEVICE — GLOVE BIOGEL SZ 7 SURGICAL PF LTX - (50PR/BX 4BX/CA)

## (undated) DEVICE — LACTATED RINGERS INJ 1000 ML - (14EA/CA 60CA/PF)

## (undated) DEVICE — GLOVE BIOGEL SZ 7.5 SURGICAL PF LTX - (50PR/BX 4BX/CA)

## (undated) DEVICE — CONNECTOR HOSE NEPTUNE FOR CYSTO ROOM

## (undated) DEVICE — SENSOR SPO2 NEO LNCS ADHESIVE (20/BX) SEE USER NOTES

## (undated) DEVICE — ZIPWIRE STIFF .035 STRAIGHT TIP (5EA/BX)

## (undated) DEVICE — KIT ANESTHESIA W/CIRCUIT & 3/LT BAG W/FILTER (20EA/CA)

## (undated) DEVICE — TUBE E-T HI-LO CUFF 7.0MM (10EA/PK)

## (undated) DEVICE — SODIUM CHL. IRRIGATION 0.9% 3000ML (4EA/CA 65CA/PF)

## (undated) DEVICE — SUCTION INSTRUMENT YANKAUER BULBOUS TIP W/O VENT (50EA/CA)

## (undated) DEVICE — NEPTUNE 4 PORT MANIFOLD - (20/PK)

## (undated) DEVICE — HEAD HOLDER JUNIOR/ADULT

## (undated) DEVICE — SCOPE DIGITAL URETEROSCOPE DISPOSABLE

## (undated) DEVICE — SET LEADWIRE 5 LEAD BEDSIDE DISPOSABLE ECG (1SET OF 5/EA)

## (undated) DEVICE — PROTECTOR ULNA NERVE - (36PR/CA)

## (undated) DEVICE — TOWEL STOP TIMEOUT SAFETY FLAG (40EA/CA)

## (undated) DEVICE — SET EXTENSION WITH 2 PORTS (48EA/CA) ***PART #2C8610 IS A SUBSTITUTE*****

## (undated) DEVICE — SPONGE GAUZESTER 4 X 4 4PLY - (128PK/CA)

## (undated) DEVICE — GLOVE BIOGEL PI INDICATOR SZ 8.0 SURGICAL PF LF -(50/BX 4BX/CA)

## (undated) DEVICE — GOWN WARMING STANDARD FLEX - (30/CA)

## (undated) DEVICE — CANISTER SUCTION 3000ML MECHANICAL FILTER AUTO SHUTOFF MEDI-VAC NONSTERILE LF DISP  (40EA/CA)

## (undated) DEVICE — PACK CYSTO III (2EA/CA)

## (undated) DEVICE — SLEEVE VASO CALF MED - (10PR/CA)

## (undated) DEVICE — WIRE GUIDE SENSOR DUAL FLEX - 5/BX

## (undated) DEVICE — BAG URODRAIN WITH TUBING - (20/CA)

## (undated) DEVICE — TUBING CLEARLINK DUO-VENT - C-FLO (48EA/CA)

## (undated) DEVICE — FIBER LASER MOSES 200 UM (1/EA)

## (undated) DEVICE — BASKET ZERO TIP